# Patient Record
Sex: MALE | Race: WHITE | NOT HISPANIC OR LATINO | Employment: UNEMPLOYED | URBAN - NONMETROPOLITAN AREA
[De-identification: names, ages, dates, MRNs, and addresses within clinical notes are randomized per-mention and may not be internally consistent; named-entity substitution may affect disease eponyms.]

---

## 2017-03-14 ENCOUNTER — TRANSCRIBE ORDERS (OUTPATIENT)
Dept: ADMINISTRATIVE | Facility: HOSPITAL | Age: 47
End: 2017-03-14

## 2017-03-14 DIAGNOSIS — M54.5 LOW BACK PAIN, UNSPECIFIED BACK PAIN LATERALITY, UNSPECIFIED CHRONICITY, WITH SCIATICA PRESENCE UNSPECIFIED: Primary | ICD-10-CM

## 2017-03-17 ENCOUNTER — HOSPITAL ENCOUNTER (OUTPATIENT)
Dept: MRI IMAGING | Facility: HOSPITAL | Age: 47
Discharge: HOME | End: 2017-03-17
Admitting: PHYSICIAN ASSISTANT

## 2017-03-17 DIAGNOSIS — M54.5 LOW BACK PAIN, UNSPECIFIED BACK PAIN LATERALITY, UNSPECIFIED CHRONICITY, WITH SCIATICA PRESENCE UNSPECIFIED: ICD-10-CM

## 2017-03-17 PROCEDURE — 72148 MRI LUMBAR SPINE W/O DYE: CPT

## 2017-03-17 PROCEDURE — 72148 MRI LUMBAR SPINE W/O DYE: CPT | Performed by: RADIOLOGY

## 2020-02-27 ENCOUNTER — OFFICE VISIT (OUTPATIENT)
Dept: SURGERY | Facility: CLINIC | Age: 50
End: 2020-02-27

## 2020-02-27 VITALS — WEIGHT: 201 LBS | HEIGHT: 67 IN | BODY MASS INDEX: 31.55 KG/M2

## 2020-02-27 DIAGNOSIS — Z12.11 ENCOUNTER FOR SCREENING COLONOSCOPY: Primary | ICD-10-CM

## 2020-02-27 PROCEDURE — S0260 H&P FOR SURGERY: HCPCS | Performed by: SURGERY

## 2020-02-27 RX ORDER — LEVOTHYROXINE SODIUM 0.2 MG/1
200 TABLET ORAL DAILY
COMMUNITY
Start: 2020-02-23

## 2020-02-27 RX ORDER — LISINOPRIL 20 MG/1
20 TABLET ORAL DAILY
COMMUNITY
Start: 2020-02-23

## 2020-02-27 RX ORDER — SODIUM, POTASSIUM,MAG SULFATES 17.5-3.13G
SOLUTION, RECONSTITUTED, ORAL ORAL
Qty: 2 BOTTLE | Refills: 0 | Status: SHIPPED | OUTPATIENT
Start: 2020-02-27

## 2020-02-27 NOTE — PROGRESS NOTES
Subjective   Toy Clemons is a 50 y.o. male.     Chief Complaint: screening colonoscopy    History of Present Illness He has never had a colonoscopy and needs a screening one. No abdominal symptoms or bowel issues.     The following portions of the patient's history were reviewed and updated as appropriate: current medications, past family history, past medical history, past social history, past surgical history and problem list.    Review of Systems   Constitutional: Negative for activity change, appetite change, chills, fever and unexpected weight change.   HENT: Negative for congestion, facial swelling and sore throat.    Eyes: Negative for photophobia and visual disturbance.   Respiratory: Negative for chest tightness, shortness of breath and wheezing.    Cardiovascular: Negative for chest pain, palpitations and leg swelling.   Gastrointestinal: Negative for abdominal distention, abdominal pain, anal bleeding, blood in stool, constipation, diarrhea, nausea, rectal pain and vomiting.   Endocrine: Negative for cold intolerance, heat intolerance, polydipsia and polyuria.   Genitourinary: Negative for difficulty urinating, dysuria, flank pain and urgency.   Musculoskeletal: Negative for back pain and myalgias.   Skin: Negative for rash and wound.   Allergic/Immunologic: Negative for immunocompromised state.   Neurological: Negative for dizziness, seizures, syncope, light-headedness, numbness and headaches.   Hematological: Negative for adenopathy. Does not bruise/bleed easily.   Psychiatric/Behavioral: Negative for behavioral problems and confusion. The patient is not nervous/anxious.        Objective   Physical Exam   Constitutional: He is oriented to person, place, and time. He appears well-developed and well-nourished. He does not appear ill. No distress.   HENT:   Head: Normocephalic. Head is without laceration. Hair is normal.   Right Ear: Hearing and ear canal normal.   Left Ear: Hearing and ear canal normal.    Nose: Nose normal. No sinus tenderness. No epistaxis. Right sinus exhibits no maxillary sinus tenderness and no frontal sinus tenderness. Left sinus exhibits no maxillary sinus tenderness and no frontal sinus tenderness.   Eyes: Pupils are equal, round, and reactive to light. Conjunctivae and lids are normal.   Neck: Normal range of motion. No JVD present. No tracheal tenderness present. No tracheal deviation present. No thyroid mass and no thyromegaly present.   Cardiovascular: Normal rate and regular rhythm. Exam reveals no gallop.   No murmur heard.  Pulmonary/Chest: Effort normal and breath sounds normal. No stridor. He has no wheezes. He exhibits no tenderness.   Abdominal: Soft. Bowel sounds are normal. He exhibits no distension, no ascites and no mass. There is no tenderness. There is no rebound and no guarding. No hernia.   Musculoskeletal: He exhibits no edema or deformity.   Lymphadenopathy:     He has no cervical adenopathy.     He has no axillary adenopathy.        Right: No inguinal and no supraclavicular adenopathy present.        Left: No inguinal and no supraclavicular adenopathy present.   Neurological: He is alert and oriented to person, place, and time. He exhibits normal muscle tone.   Skin: Skin is warm, dry and intact. No rash noted. No erythema. No pallor.   Psychiatric: He has a normal mood and affect. His behavior is normal. Thought content normal.   Vitals reviewed.       Assessment/Plan   Toy was seen today for colonoscopy.    Diagnoses and all orders for this visit:    Encounter for screening colonoscopy    colonoscopy

## 2020-03-06 ENCOUNTER — HOSPITAL ENCOUNTER (OUTPATIENT)
Facility: HOSPITAL | Age: 50
Setting detail: HOSPITAL OUTPATIENT SURGERY
Discharge: HOME | End: 2020-03-06
Attending: SURGERY | Admitting: SURGERY

## 2020-03-06 ENCOUNTER — ANESTHESIA EVENT (OUTPATIENT)
Dept: PERIOP | Facility: HOSPITAL | Age: 50
End: 2020-03-06

## 2020-03-06 ENCOUNTER — ANESTHESIA (OUTPATIENT)
Dept: PERIOP | Facility: HOSPITAL | Age: 50
End: 2020-03-06

## 2020-03-06 VITALS
SYSTOLIC BLOOD PRESSURE: 121 MMHG | WEIGHT: 194 LBS | DIASTOLIC BLOOD PRESSURE: 84 MMHG | BODY MASS INDEX: 30.45 KG/M2 | OXYGEN SATURATION: 97 % | RESPIRATION RATE: 18 BRPM | TEMPERATURE: 97.5 F | HEIGHT: 67 IN | HEART RATE: 64 BPM

## 2020-03-06 PROCEDURE — 25010000002 PROPOFOL 10 MG/ML EMULSION: Performed by: NURSE ANESTHETIST, CERTIFIED REGISTERED

## 2020-03-06 PROCEDURE — 45378 DIAGNOSTIC COLONOSCOPY: CPT | Performed by: SURGERY

## 2020-03-06 RX ORDER — ONDANSETRON 2 MG/ML
4 INJECTION INTRAMUSCULAR; INTRAVENOUS AS NEEDED
Status: DISCONTINUED | OUTPATIENT
Start: 2020-03-06 | End: 2020-03-06 | Stop reason: HOSPADM

## 2020-03-06 RX ORDER — IPRATROPIUM BROMIDE AND ALBUTEROL SULFATE 2.5; .5 MG/3ML; MG/3ML
3 SOLUTION RESPIRATORY (INHALATION) ONCE AS NEEDED
Status: DISCONTINUED | OUTPATIENT
Start: 2020-03-06 | End: 2020-03-06 | Stop reason: HOSPADM

## 2020-03-06 RX ORDER — SODIUM CHLORIDE 0.9 % (FLUSH) 0.9 %
10 SYRINGE (ML) INJECTION AS NEEDED
Status: DISCONTINUED | OUTPATIENT
Start: 2020-03-06 | End: 2020-03-06 | Stop reason: HOSPADM

## 2020-03-06 RX ORDER — PROPOFOL 10 MG/ML
VIAL (ML) INTRAVENOUS AS NEEDED
Status: DISCONTINUED | OUTPATIENT
Start: 2020-03-06 | End: 2020-03-06 | Stop reason: SURG

## 2020-03-06 RX ORDER — SODIUM CHLORIDE 0.9 % (FLUSH) 0.9 %
10 SYRINGE (ML) INJECTION EVERY 12 HOURS SCHEDULED
Status: DISCONTINUED | OUTPATIENT
Start: 2020-03-06 | End: 2020-03-06 | Stop reason: HOSPADM

## 2020-03-06 RX ORDER — SODIUM CHLORIDE, SODIUM LACTATE, POTASSIUM CHLORIDE, CALCIUM CHLORIDE 600; 310; 30; 20 MG/100ML; MG/100ML; MG/100ML; MG/100ML
125 INJECTION, SOLUTION INTRAVENOUS CONTINUOUS
Status: DISCONTINUED | OUTPATIENT
Start: 2020-03-06 | End: 2020-03-06 | Stop reason: HOSPADM

## 2020-03-06 RX ADMIN — PROPOFOL 120 MG: 10 INJECTION, EMULSION INTRAVENOUS at 11:15

## 2020-03-06 RX ADMIN — SODIUM CHLORIDE, POTASSIUM CHLORIDE, SODIUM LACTATE AND CALCIUM CHLORIDE: 600; 310; 30; 20 INJECTION, SOLUTION INTRAVENOUS at 11:11

## 2020-03-06 RX ADMIN — PROPOFOL 140 MCG/KG/MIN: 10 INJECTION, EMULSION INTRAVENOUS at 11:15

## 2020-03-06 NOTE — ANESTHESIA PREPROCEDURE EVALUATION
Anesthesia Evaluation     no history of anesthetic complications:  NPO Solid Status: > 8 hours  NPO Liquid Status: > 8 hours           Airway   Mallampati: II  TM distance: >3 FB  Neck ROM: full  No difficulty expected  Dental - normal exam   (+) upper dentures    Pulmonary - normal exam   (+) a smoker, sleep apnea,   Cardiovascular - normal exam    (+) hypertension,       Neuro/Psych  GI/Hepatic/Renal/Endo      Musculoskeletal     Abdominal  - normal exam   Substance History      OB/GYN          Other                      Anesthesia Plan    ASA 2     general     intravenous induction     Anesthetic plan, all risks, benefits, and alternatives have been provided, discussed and informed consent has been obtained with: patient.

## 2020-03-06 NOTE — OP NOTE
COLONOSCOPY  Procedure Note    Toy Clemons  3/6/2020    Pre-op Diagnosis:   Encounter for screening colonoscopy [Z12.11]    Post-op Diagnosis:  Normal colon       Procedure(s):  COLONOSCOPY    Surgeon(s):  Juan Ramon Roper MD    Anesthesia: General    Staff:   Circulator: Florence Mcclelland RN  Endo Technician: Carmen Pimentel    Estimated Blood Loss: none    Specimens:                * No orders in the log *      Drains: * No LDAs found *    Procedure: The scope advanced to the cecum. The ileocecal valve and terminal ileum were unremarkable. No polyps or inflammation were seen throughout the colon.     Findings: normal colon    Complications: none   Grafts / Implants N/A    Juan Ramon Roper MD     Date: 3/6/2020  Time: 12:06 PM

## 2020-03-06 NOTE — ANESTHESIA POSTPROCEDURE EVALUATION
Patient: Toy Clemons    Procedure Summary     Date:  03/06/20 Room / Location:  [facility] OR  /  [facility] OR    Anesthesia Start:  1111 Anesthesia Stop:  1129    Procedure:  COLONOSCOPY (N/A ) Diagnosis:       Encounter for screening colonoscopy      (Encounter for screening colonoscopy [Z12.11])    Surgeon:  Juan Ramon Roper MD Provider:  Kendall Leigh MD    Anesthesia Type:  general ASA Status:  2          Anesthesia Type: general    Vitals  Vitals Value Taken Time   /84 3/6/2020 12:05 PM   Temp 97.5 °F (36.4 °C) 3/6/2020 11:35 AM   Pulse 64 3/6/2020 12:05 PM   Resp 18 3/6/2020 12:05 PM   SpO2 97 % 3/6/2020 12:05 PM           Post Anesthesia Care and Evaluation    Patient location during evaluation: bedside  Patient participation: complete - patient participated  Level of consciousness: awake and alert  Pain score: 1  Pain management: adequate  Airway patency: patent  Anesthetic complications: No anesthetic complications  PONV Status: none  Cardiovascular status: acceptable  Respiratory status: acceptable  Hydration status: acceptable

## 2020-10-28 ENCOUNTER — HOSPITAL ENCOUNTER (EMERGENCY)
Facility: HOSPITAL | Age: 50
Discharge: HOME | End: 2020-10-28
Attending: EMERGENCY MEDICINE | Admitting: EMERGENCY MEDICINE

## 2020-10-28 VITALS
RESPIRATION RATE: 18 BRPM | TEMPERATURE: 97.7 F | DIASTOLIC BLOOD PRESSURE: 77 MMHG | HEIGHT: 67 IN | HEART RATE: 68 BPM | OXYGEN SATURATION: 98 % | BODY MASS INDEX: 31.39 KG/M2 | SYSTOLIC BLOOD PRESSURE: 112 MMHG | WEIGHT: 200 LBS

## 2020-10-28 DIAGNOSIS — F10.10 ALCOHOL ABUSE: Primary | ICD-10-CM

## 2020-10-28 LAB
6-ACETYL MORPHINE: NEGATIVE
ALBUMIN SERPL-MCNC: 4.72 G/DL (ref 3.5–5.2)
ALBUMIN/GLOB SERPL: 1.9 G/DL
ALP SERPL-CCNC: 70 U/L (ref 39–117)
ALT SERPL W P-5'-P-CCNC: 20 U/L (ref 1–41)
AMPHET+METHAMPHET UR QL: NEGATIVE
ANION GAP SERPL CALCULATED.3IONS-SCNC: 11.5 MMOL/L (ref 5–15)
AST SERPL-CCNC: 35 U/L (ref 1–40)
BARBITURATES UR QL SCN: NEGATIVE
BASOPHILS # BLD AUTO: 0.05 10*3/MM3 (ref 0–0.2)
BASOPHILS NFR BLD AUTO: 0.5 % (ref 0–1.5)
BENZODIAZ UR QL SCN: NEGATIVE
BILIRUB SERPL-MCNC: 0.4 MG/DL (ref 0–1.2)
BUN SERPL-MCNC: 5 MG/DL (ref 6–20)
BUN/CREAT SERPL: 6.7 (ref 7–25)
BUPRENORPHINE SERPL-MCNC: NEGATIVE NG/ML
CALCIUM SPEC-SCNC: 9.1 MG/DL (ref 8.6–10.5)
CANNABINOIDS SERPL QL: NEGATIVE
CHLORIDE SERPL-SCNC: 96 MMOL/L (ref 98–107)
CO2 SERPL-SCNC: 24.5 MMOL/L (ref 22–29)
COCAINE UR QL: NEGATIVE
CREAT SERPL-MCNC: 0.75 MG/DL (ref 0.76–1.27)
DEPRECATED RDW RBC AUTO: 47.1 FL (ref 37–54)
EOSINOPHIL # BLD AUTO: 0.3 10*3/MM3 (ref 0–0.4)
EOSINOPHIL NFR BLD AUTO: 3.2 % (ref 0.3–6.2)
ERYTHROCYTE [DISTWIDTH] IN BLOOD BY AUTOMATED COUNT: 13.8 % (ref 12.3–15.4)
ETHANOL BLD-MCNC: 229 MG/DL (ref 0–10)
ETHANOL UR QL: 0.23 %
GFR SERPL CREATININE-BSD FRML MDRD: 111 ML/MIN/1.73
GLOBULIN UR ELPH-MCNC: 2.5 GM/DL
GLUCOSE SERPL-MCNC: 89 MG/DL (ref 65–99)
HCT VFR BLD AUTO: 44.6 % (ref 37.5–51)
HGB BLD-MCNC: 15.2 G/DL (ref 13–17.7)
IMM GRANULOCYTES # BLD AUTO: 0.04 10*3/MM3 (ref 0–0.05)
IMM GRANULOCYTES NFR BLD AUTO: 0.4 % (ref 0–0.5)
LYMPHOCYTES # BLD AUTO: 3.51 10*3/MM3 (ref 0.7–3.1)
LYMPHOCYTES NFR BLD AUTO: 37.5 % (ref 19.6–45.3)
MAGNESIUM SERPL-MCNC: 2.3 MG/DL (ref 1.6–2.6)
MCH RBC QN AUTO: 31.6 PG (ref 26.6–33)
MCHC RBC AUTO-ENTMCNC: 34.1 G/DL (ref 31.5–35.7)
MCV RBC AUTO: 92.7 FL (ref 79–97)
METHADONE UR QL SCN: NEGATIVE
MONOCYTES # BLD AUTO: 0.68 10*3/MM3 (ref 0.1–0.9)
MONOCYTES NFR BLD AUTO: 7.3 % (ref 5–12)
NEUTROPHILS NFR BLD AUTO: 4.78 10*3/MM3 (ref 1.7–7)
NEUTROPHILS NFR BLD AUTO: 51.1 % (ref 42.7–76)
NRBC BLD AUTO-RTO: 0 /100 WBC (ref 0–0.2)
OPIATES UR QL: NEGATIVE
OXYCODONE UR QL SCN: NEGATIVE
PCP UR QL SCN: NEGATIVE
PLATELET # BLD AUTO: 278 10*3/MM3 (ref 140–450)
PMV BLD AUTO: 8.9 FL (ref 6–12)
POTASSIUM SERPL-SCNC: 4.6 MMOL/L (ref 3.5–5.2)
PROT SERPL-MCNC: 7.2 G/DL (ref 6–8.5)
RBC # BLD AUTO: 4.81 10*6/MM3 (ref 4.14–5.8)
SODIUM SERPL-SCNC: 132 MMOL/L (ref 136–145)
WBC # BLD AUTO: 9.36 10*3/MM3 (ref 3.4–10.8)

## 2020-10-28 PROCEDURE — 80307 DRUG TEST PRSMV CHEM ANLYZR: CPT | Performed by: PHYSICIAN ASSISTANT

## 2020-10-28 PROCEDURE — 85025 COMPLETE CBC W/AUTO DIFF WBC: CPT | Performed by: PHYSICIAN ASSISTANT

## 2020-10-28 PROCEDURE — 99284 EMERGENCY DEPT VISIT MOD MDM: CPT

## 2020-10-28 PROCEDURE — 83735 ASSAY OF MAGNESIUM: CPT | Performed by: PHYSICIAN ASSISTANT

## 2020-10-28 PROCEDURE — 80053 COMPREHEN METABOLIC PANEL: CPT | Performed by: PHYSICIAN ASSISTANT

## 2022-07-13 ENCOUNTER — HOSPITAL ENCOUNTER (EMERGENCY)
Facility: HOSPITAL | Age: 52
Discharge: HOME | End: 2022-07-13
Attending: STUDENT IN AN ORGANIZED HEALTH CARE EDUCATION/TRAINING PROGRAM | Admitting: STUDENT IN AN ORGANIZED HEALTH CARE EDUCATION/TRAINING PROGRAM

## 2022-07-13 ENCOUNTER — APPOINTMENT (OUTPATIENT)
Dept: GENERAL RADIOLOGY | Facility: HOSPITAL | Age: 52
End: 2022-07-13

## 2022-07-13 VITALS
WEIGHT: 200 LBS | HEART RATE: 75 BPM | DIASTOLIC BLOOD PRESSURE: 75 MMHG | SYSTOLIC BLOOD PRESSURE: 122 MMHG | BODY MASS INDEX: 31.39 KG/M2 | OXYGEN SATURATION: 98 % | TEMPERATURE: 97.1 F | HEIGHT: 67 IN | RESPIRATION RATE: 18 BRPM

## 2022-07-13 DIAGNOSIS — U07.1 COVID-19: ICD-10-CM

## 2022-07-13 DIAGNOSIS — J06.9 VIRAL UPPER RESPIRATORY INFECTION: Primary | ICD-10-CM

## 2022-07-13 LAB
FLUAV RNA RESP QL NAA+PROBE: NOT DETECTED
FLUBV RNA RESP QL NAA+PROBE: NOT DETECTED
SARS-COV-2 RNA RESP QL NAA+PROBE: DETECTED

## 2022-07-13 PROCEDURE — 71045 X-RAY EXAM CHEST 1 VIEW: CPT

## 2022-07-13 PROCEDURE — 87636 SARSCOV2 & INF A&B AMP PRB: CPT | Performed by: STUDENT IN AN ORGANIZED HEALTH CARE EDUCATION/TRAINING PROGRAM

## 2022-07-13 PROCEDURE — 96372 THER/PROPH/DIAG INJ SC/IM: CPT

## 2022-07-13 PROCEDURE — 99283 EMERGENCY DEPT VISIT LOW MDM: CPT

## 2022-07-13 PROCEDURE — 25010000002 DEXAMETHASONE PER 1 MG: Performed by: STUDENT IN AN ORGANIZED HEALTH CARE EDUCATION/TRAINING PROGRAM

## 2022-07-13 PROCEDURE — 25010000002 KETOROLAC TROMETHAMINE PER 15 MG: Performed by: STUDENT IN AN ORGANIZED HEALTH CARE EDUCATION/TRAINING PROGRAM

## 2022-07-13 PROCEDURE — 71045 X-RAY EXAM CHEST 1 VIEW: CPT | Performed by: RADIOLOGY

## 2022-07-13 RX ORDER — AZITHROMYCIN 250 MG/1
250 TABLET, FILM COATED ORAL DAILY
Qty: 4 TABLET | Refills: 0 | Status: SHIPPED | OUTPATIENT
Start: 2022-07-13

## 2022-07-13 RX ORDER — DEXAMETHASONE 6 MG/1
6 TABLET ORAL
Qty: 6 TABLET | Refills: 0 | Status: SHIPPED | OUTPATIENT
Start: 2022-07-13

## 2022-07-13 RX ORDER — DEXAMETHASONE SODIUM PHOSPHATE 4 MG/ML
4 INJECTION, SOLUTION INTRA-ARTICULAR; INTRALESIONAL; INTRAMUSCULAR; INTRAVENOUS; SOFT TISSUE ONCE
Status: COMPLETED | OUTPATIENT
Start: 2022-07-13 | End: 2022-07-13

## 2022-07-13 RX ORDER — KETOROLAC TROMETHAMINE 30 MG/ML
30 INJECTION, SOLUTION INTRAMUSCULAR; INTRAVENOUS ONCE
Status: COMPLETED | OUTPATIENT
Start: 2022-07-13 | End: 2022-07-13

## 2022-07-13 RX ORDER — AZITHROMYCIN 250 MG/1
500 TABLET, FILM COATED ORAL ONCE
Status: COMPLETED | OUTPATIENT
Start: 2022-07-13 | End: 2022-07-13

## 2022-07-13 RX ADMIN — KETOROLAC TROMETHAMINE 30 MG: 30 INJECTION, SOLUTION INTRAMUSCULAR; INTRAVENOUS at 09:45

## 2022-07-13 RX ADMIN — AZITHROMYCIN 500 MG: 250 TABLET, FILM COATED ORAL at 10:06

## 2022-07-13 RX ADMIN — DEXAMETHASONE SODIUM PHOSPHATE 4 MG: 4 INJECTION, SOLUTION INTRA-ARTICULAR; INTRALESIONAL; INTRAMUSCULAR; INTRAVENOUS; SOFT TISSUE at 09:45

## 2022-07-13 NOTE — ED PROVIDER NOTES
Subjective   52-year-old male presents the ER with a primary complaint of cough, fever, and congestion for the past 1 to 2 days.  Patient noted mild cough that started yesterday.  Patient noted fever this morning.  Improvement with over-the-counter medications.  No obvious aggravating factors.  Denied chest pain.  Denied hemoptysis.  Denied sputum production.  No obvious further aggravating or alleviating factors.  No infectious exposure.  Vital signs stable          Review of Systems   Constitutional: Positive for chills, fatigue and fever.   Respiratory: Positive for cough.    All other systems reviewed and are negative.      Past Medical History:   Diagnosis Date   • Disease of thyroid gland    • Hypertension    • Sleep apnea        No Known Allergies    Past Surgical History:   Procedure Laterality Date   • COLONOSCOPY N/A 3/6/2020    Procedure: COLONOSCOPY;  Surgeon: Juan Ramon Roper MD;  Location: [facility];  Service: Gastroenterology;  Laterality: N/A;   • HERNIA REPAIR     • SINUS SURGERY         No family history on file.    Social History     Socioeconomic History   • Marital status:    Tobacco Use   • Smoking status: Current Every Day Smoker     Packs/day: 1.00     Years: 30.00     Pack years: 30.00     Types: Cigarettes   • Smokeless tobacco: Current User   Substance and Sexual Activity   • Alcohol use: Not Currently     Comment: QUIT FEB 2020   • Drug use: Never           Objective   Physical Exam  Constitutional:       General: He is not in acute distress.     Appearance: He is well-developed. He is not ill-appearing.   HENT:      Head: Normocephalic and atraumatic.   Eyes:      Extraocular Movements: Extraocular movements intact.      Pupils: Pupils are equal, round, and reactive to light.   Neck:      Vascular: No JVD.   Cardiovascular:      Rate and Rhythm: Normal rate and regular rhythm.      Heart sounds: Normal heart sounds. No murmur heard.  Pulmonary:      Effort: No tachypnea, accessory  muscle usage or respiratory distress.      Breath sounds: Normal breath sounds. No stridor. No decreased breath sounds, wheezing, rhonchi or rales.   Chest:      Chest wall: No deformity, tenderness or crepitus.   Abdominal:      General: Bowel sounds are normal.      Palpations: Abdomen is soft.      Tenderness: There is no abdominal tenderness. There is no guarding or rebound.   Musculoskeletal:         General: Normal range of motion.      Cervical back: Normal range of motion and neck supple.      Right lower leg: No tenderness. No edema.      Left lower leg: No tenderness. No edema.   Lymphadenopathy:      Cervical: No cervical adenopathy.   Skin:     General: Skin is warm and dry.      Coloration: Skin is not cyanotic.      Findings: No ecchymosis or erythema.   Neurological:      General: No focal deficit present.      Mental Status: He is alert and oriented to person, place, and time.      Cranial Nerves: No cranial nerve deficit.      Motor: No weakness.   Psychiatric:         Mood and Affect: Mood normal. Mood is not anxious.         Behavior: Behavior normal. Behavior is not agitated.         Procedures           ED Course  ED Course as of 07/13/22 0959   Wed Jul 13, 2022   0958 COVID testing positive.  Chest x-ray relatively unremarkable.  No obvious signs of pneumonia.  Patient will receive Decadron and azithromycin.  Work up and results were discussed throughly with the patient.  The patient will be discharged for further monitoring and management with their PCP.  Red flags, warning signs, worsening symptoms, and when to return to the ER discussed with and understood by the patient.  Patient will follow up with their PCP in a timely manner.  Vitals stable at discharge. [SF]      ED Course User Index  [SF] Karri Oconnell DO                                           [facility]    Final diagnoses:   Viral upper respiratory infection   COVID-19       ED Disposition  ED Disposition     ED Disposition   Discharge     Condition   Stable    Comment   --             Kiki Vides PA  [address]  [address]  [facility] [id number] [phone]    In 1 week      [facility]  [address] [facility] [address] [phone]    If symptoms worsen         Medication List      New Prescriptions    azithromycin 250 MG tablet  Commonly known as: ZITHROMAX  Take 1 tablet by mouth Daily.     dexamethasone 6 MG tablet  Commonly known as: DECADRON  Take 1 tablet by mouth Daily With Breakfast.           Where to Get Your Medications      These medications were sent to [facility] [address] [place] [address] - [address] [address] - [phone]  - [phone] FX  [address] [id number]    Phone: [phone]   · azithromycin 250 MG tablet  · dexamethasone 6 MG tablet          Karri Oconnell DO  07/13/22 0959

## 2023-09-21 ENCOUNTER — TRANSCRIBE ORDERS (OUTPATIENT)
Dept: ADMINISTRATIVE | Facility: HOSPITAL | Age: 53
End: 2023-09-21

## 2023-09-21 DIAGNOSIS — R10.11 RIGHT UPPER QUADRANT PAIN: Primary | ICD-10-CM

## 2023-09-22 ENCOUNTER — HOSPITAL ENCOUNTER (OUTPATIENT)
Dept: ULTRASOUND IMAGING | Facility: HOSPITAL | Age: 53
Discharge: HOME | End: 2023-09-22
Admitting: PHYSICIAN ASSISTANT
Payer: MEDICAID

## 2023-09-22 DIAGNOSIS — R10.11 RIGHT UPPER QUADRANT PAIN: ICD-10-CM

## 2023-09-22 PROCEDURE — 76700 US EXAM ABDOM COMPLETE: CPT

## 2023-10-16 ENCOUNTER — TRANSCRIBE ORDERS (OUTPATIENT)
Dept: ADMINISTRATIVE | Facility: HOSPITAL | Age: 53
End: 2023-10-16
Payer: MEDICAID

## 2023-10-16 DIAGNOSIS — K52.89: Primary | ICD-10-CM

## 2023-10-17 ENCOUNTER — HOSPITAL ENCOUNTER (OUTPATIENT)
Dept: NUCLEAR MEDICINE | Facility: HOSPITAL | Age: 53
Discharge: HOME | End: 2023-10-17
Payer: MEDICAID

## 2023-10-17 DIAGNOSIS — K52.89: ICD-10-CM

## 2023-10-17 PROCEDURE — A9537 TC99M MEBROFENIN: HCPCS | Performed by: PHYSICIAN ASSISTANT

## 2023-10-17 PROCEDURE — 25010000002 SINCALIDE PER 5 MCG: Performed by: PHYSICIAN ASSISTANT

## 2023-10-17 PROCEDURE — 0 TECHNETIUM TC 99M MEBROFENIN KIT: Performed by: PHYSICIAN ASSISTANT

## 2023-10-17 PROCEDURE — 78227 HEPATOBIL SYST IMAGE W/DRUG: CPT

## 2023-10-17 RX ORDER — KIT FOR THE PREPARATION OF TECHNETIUM TC 99M MEBROFENIN 45 MG/10ML
1 INJECTION, POWDER, LYOPHILIZED, FOR SOLUTION INTRAVENOUS
Status: COMPLETED | OUTPATIENT
Start: 2023-10-17 | End: 2023-10-17

## 2023-10-17 RX ORDER — SINCALIDE 5 UG/5ML
0.04 INJECTION, POWDER, LYOPHILIZED, FOR SOLUTION INTRAVENOUS
Status: COMPLETED | OUTPATIENT
Start: 2023-10-17 | End: 2023-10-17

## 2023-10-17 RX ADMIN — MEBROFENIN 1 DOSE: 45 INJECTION, POWDER, LYOPHILIZED, FOR SOLUTION INTRAVENOUS at 14:46

## 2023-10-17 RX ADMIN — SINCALIDE 3.6 MCG: 5 INJECTION, POWDER, LYOPHILIZED, FOR SOLUTION INTRAVENOUS at 15:30

## 2023-11-10 ENCOUNTER — HOSPITAL ENCOUNTER (EMERGENCY)
Facility: HOSPITAL | Age: 53
Discharge: HOME | End: 2023-11-10
Attending: EMERGENCY MEDICINE
Payer: MEDICAID

## 2023-11-10 ENCOUNTER — APPOINTMENT (OUTPATIENT)
Dept: GENERAL RADIOLOGY | Facility: HOSPITAL | Age: 53
End: 2023-11-10
Payer: MEDICAID

## 2023-11-10 VITALS
HEART RATE: 103 BPM | DIASTOLIC BLOOD PRESSURE: 78 MMHG | BODY MASS INDEX: 31.08 KG/M2 | RESPIRATION RATE: 16 BRPM | WEIGHT: 198 LBS | SYSTOLIC BLOOD PRESSURE: 146 MMHG | HEIGHT: 67 IN | TEMPERATURE: 97.6 F | OXYGEN SATURATION: 96 %

## 2023-11-10 DIAGNOSIS — S39.012A STRAIN OF LUMBAR REGION, INITIAL ENCOUNTER: Primary | ICD-10-CM

## 2023-11-10 PROCEDURE — 72110 X-RAY EXAM L-2 SPINE 4/>VWS: CPT | Performed by: RADIOLOGY

## 2023-11-10 PROCEDURE — 96372 THER/PROPH/DIAG INJ SC/IM: CPT

## 2023-11-10 PROCEDURE — 99282 EMERGENCY DEPT VISIT SF MDM: CPT

## 2023-11-10 PROCEDURE — 72110 X-RAY EXAM L-2 SPINE 4/>VWS: CPT

## 2023-11-10 PROCEDURE — 25010000002 DEXAMETHASONE SODIUM PHOSPHATE 10 MG/ML SOLUTION: Performed by: PHYSICIAN ASSISTANT

## 2023-11-10 PROCEDURE — 25010000002 ORPHENADRINE CITRATE PER 60 MG: Performed by: PHYSICIAN ASSISTANT

## 2023-11-10 PROCEDURE — 25010000002 KETOROLAC TROMETHAMINE PER 15 MG: Performed by: PHYSICIAN ASSISTANT

## 2023-11-10 RX ORDER — KETOROLAC TROMETHAMINE 10 MG/1
10 TABLET, FILM COATED ORAL EVERY 6 HOURS PRN
Qty: 20 TABLET | Refills: 0 | Status: SHIPPED | OUTPATIENT
Start: 2023-11-10 | End: 2023-11-15

## 2023-11-10 RX ORDER — KETOROLAC TROMETHAMINE 30 MG/ML
60 INJECTION, SOLUTION INTRAMUSCULAR; INTRAVENOUS ONCE
Status: COMPLETED | OUTPATIENT
Start: 2023-11-10 | End: 2023-11-10

## 2023-11-10 RX ORDER — ORPHENADRINE CITRATE 30 MG/ML
60 INJECTION INTRAMUSCULAR; INTRAVENOUS ONCE
Status: COMPLETED | OUTPATIENT
Start: 2023-11-10 | End: 2023-11-10

## 2023-11-10 RX ORDER — CYCLOBENZAPRINE HCL 10 MG
10 TABLET ORAL 3 TIMES DAILY PRN
Qty: 21 TABLET | Refills: 0 | Status: SHIPPED | OUTPATIENT
Start: 2023-11-10 | End: 2023-11-17

## 2023-11-10 RX ORDER — METHYLPREDNISOLONE 4 MG/1
TABLET ORAL
Qty: 21 TABLET | Refills: 0 | Status: SHIPPED | OUTPATIENT
Start: 2023-11-10

## 2023-11-10 RX ORDER — DEXAMETHASONE SODIUM PHOSPHATE 10 MG/ML
10 INJECTION, SOLUTION INTRAMUSCULAR; INTRAVENOUS ONCE
Status: COMPLETED | OUTPATIENT
Start: 2023-11-10 | End: 2023-11-10

## 2023-11-10 RX ADMIN — ORPHENADRINE CITRATE 60 MG: 60 INJECTION INTRAMUSCULAR; INTRAVENOUS at 11:09

## 2023-11-10 RX ADMIN — DEXAMETHASONE SODIUM PHOSPHATE 10 MG: 10 INJECTION INTRAMUSCULAR; INTRAVENOUS at 11:09

## 2023-11-10 RX ADMIN — KETOROLAC TROMETHAMINE 60 MG: 60 INJECTION, SOLUTION INTRAMUSCULAR at 11:09

## 2023-11-10 NOTE — ED NOTES
Morphine refused by patient at this time, returned to med return box in [facility] at this time with second RN. Provider made aware

## 2023-11-10 NOTE — ED TRIAGE NOTES
MEDICAL SCREENING:    Reason for Visit: Back Pain    Patient initially seen in triage.  The patient was advised further evaluation and diagnostic testing will be needed, some of the treatment and testing will be initiated in the lobby in order to begin the process.  The patient will be returned to the waiting area for the time being and possibly be re-assessed by a subsequent ED provider.  The patient will be brought back to the treatment area in as timely manner as possible.

## 2023-11-10 NOTE — ED PROVIDER NOTES
Subjective   History of Present Illness  This is a 53 year old male patient who presents to the ER with chief complaint of low back pain. PMH significant for chronic low back pain, hypothyroidism, HTN and sleep apnea. 1 week ago, without precipitating injury, the patient while at work as a , started experiencing bilateral lower back pain. He says he has had pain like this before. The pain radiates into the posterior aspect of the left leg and into his groin area. He denies numbness/tingling, bowel/bladder dysfunction. He has tried OTC regimens for pain relief without success.       Review of Systems   Constitutional: Negative.  Negative for fever.   HENT: Negative.     Respiratory: Negative.     Cardiovascular: Negative.  Negative for chest pain.   Gastrointestinal: Negative.  Negative for abdominal pain.   Endocrine: Negative.    Genitourinary: Negative.  Negative for dysuria.   Musculoskeletal:  Positive for back pain. Negative for arthralgias, gait problem, joint swelling, myalgias, neck pain and neck stiffness.   Skin: Negative.    Neurological: Negative.    Psychiatric/Behavioral: Negative.     All other systems reviewed and are negative.      Past Medical History:   Diagnosis Date    Disease of thyroid gland     Hypertension     Sleep apnea        No Known Allergies    Past Surgical History:   Procedure Laterality Date    COLONOSCOPY N/A 3/6/2020    Procedure: COLONOSCOPY;  Surgeon: Juan Ramon Roper MD;  Location: [facility];  Service: Gastroenterology;  Laterality: N/A;    HERNIA REPAIR      SINUS SURGERY         No family history on file.    Social History     Socioeconomic History    Marital status:    Tobacco Use    Smoking status: Every Day     Packs/day: 1.00     Years: 30.00     Additional pack years: 0.00     Total pack years: 30.00     Types: Cigarettes    Smokeless tobacco: Current   Substance and Sexual Activity    Alcohol use: Not Currently     Comment: QUIT FEB 2020     Drug use: Never           Objective   Physical Exam  Vitals and nursing note reviewed.   Constitutional:       General: He is not in acute distress.     Appearance: He is well-developed. He is not diaphoretic.   HENT:      Head: Normocephalic and atraumatic.      Right Ear: External ear normal.      Left Ear: External ear normal.      Nose: Nose normal.   Eyes:      Conjunctiva/sclera: Conjunctivae normal.      Pupils: Pupils are equal, round, and reactive to light.   Neck:      Vascular: No JVD.      Trachea: No tracheal deviation.   Cardiovascular:      Rate and Rhythm: Normal rate and regular rhythm.      Heart sounds: Normal heart sounds. No murmur heard.  Pulmonary:      Effort: Pulmonary effort is normal. No respiratory distress.      Breath sounds: Normal breath sounds. No wheezing.   Abdominal:      General: Bowel sounds are normal.      Palpations: Abdomen is soft.      Tenderness: There is no abdominal tenderness.   Musculoskeletal:         General: Tenderness present. No swelling, deformity or signs of injury. Normal range of motion.      Cervical back: Normal range of motion and neck supple.      Comments: Skin about the low back is intact with no bruising, abrasion or edema. Tenderness to palpation noted in the paraspinal muscles of the Lspine bilaterally. Full ROM BLE. Neurovascular status and sensation BLE intact.    Skin:     General: Skin is warm and dry.      Coloration: Skin is not pale.      Findings: No erythema or rash.   Neurological:      General: No focal deficit present.      Mental Status: He is alert and oriented to person, place, and time. Mental status is at baseline.      Cranial Nerves: No cranial nerve deficit.      Sensory: No sensory deficit.      Motor: No weakness.      Coordination: Coordination normal.      Gait: Gait normal.      Deep Tendon Reflexes: Reflexes normal.   Psychiatric:         Behavior: Behavior normal.         Thought Content: Thought content normal.          Procedures           ED Course  ED Course as of 11/10/23 1249   Fri Nov 10, 2023   1016 XR Spine Lumbar Complete 4+VW  IMPRESSION:    No acute findings in the lumbar spine.        This report was finalized on 11/10/2023 10:09 AM by Dr. Moe Murray MD.   [MM]   1229 Patient diagnosed with low back strain. Discussed the possibility of ordering an MRI today but he would rather follow up with his PCP and do that outpatient. Also offered to explore kidney stone as a possible diagnosis by ordering a UA but he says he would just rather leave at this time. Will be d/c rx for medrol dose packet, toradol and flexeril. Will return to ER if symptoms worsen.  [MM]      ED Course User Index  [MM] Tamica Pacheco PA                                           Medical Decision Making    This is a 53 year old male patient who presents to the ER with chief complaint of low back pain. PMH significant for chronic low back pain, hypothyroidism, HTN and sleep apnea. 1 week ago, without precipitating injury, the patient while at work as a , started experiencing bilateral lower back pain. He says he has had pain like this before. The pain radiates into the posterior aspect of the left leg and into his groin area. He denies numbness/tingling, bowel/bladder dysfunction. He has tried OTC regimens for pain relief without success.       Problems Addressed:  Strain of lumbar region, initial encounter: complicated acute illness or injury    Amount and/or Complexity of Data Reviewed  Radiology: ordered. Decision-making details documented in ED Course.    Risk  Prescription drug management.        Final diagnoses:   Strain of lumbar region, initial encounter       ED Disposition  ED Disposition       ED Disposition   Discharge    Condition   Stable    Comment   --               Kiki Vides PA  [address]  [address]  [facility] [address]  [phone]    In 2 days           Medication List        New  Prescriptions      cyclobenzaprine 10 MG tablet  Commonly known as: FLEXERIL  Take 1 tablet by mouth 3 (Three) Times a Day As Needed for Muscle Spasms for up to 7 days.     ketorolac 10 MG tablet  Commonly known as: TORADOL  Take 1 tablet by mouth Every 6 (Six) Hours As Needed for Moderate Pain for up to 5 days.     methylPREDNISolone 4 MG dose pack  Commonly known as: MEDROL  Take as directed on package instructions.            Stop      azithromycin 250 MG tablet  Commonly known as: ZITHROMAX     dexAMETHasone 6 MG tablet  Commonly known as: DECADRON               Where to Get Your Medications        These medications were sent to [facility] [address] [place], [place] [address] [address] [address] - [phone]  - [phone]   [address] [address]      Phone: [phone]   cyclobenzaprine 10 MG tablet  ketorolac 10 MG tablet  methylPREDNISolone 4 MG dose pack            Tamica Pacheco PA  11/10/23 1237       Tamica Pacheco PA  11/10/23 1244

## 2024-02-27 ENCOUNTER — TRANSCRIBE ORDERS (OUTPATIENT)
Dept: ADMINISTRATIVE | Facility: HOSPITAL | Age: 54
End: 2024-02-27
Payer: MEDICAID

## 2024-02-27 DIAGNOSIS — R10.9 ABDOMINAL PAIN, UNSPECIFIED ABDOMINAL LOCATION: Primary | ICD-10-CM

## 2024-03-15 ENCOUNTER — HOSPITAL ENCOUNTER (OUTPATIENT)
Dept: CT IMAGING | Facility: HOSPITAL | Age: 54
Discharge: HOME | End: 2024-03-15
Payer: MEDICAID

## 2024-03-15 DIAGNOSIS — R10.9 ABDOMINAL PAIN, UNSPECIFIED ABDOMINAL LOCATION: ICD-10-CM

## 2024-03-15 PROCEDURE — 25510000001 IOPAMIDOL 61 % SOLUTION: Performed by: NURSE PRACTITIONER

## 2024-03-15 PROCEDURE — 74160 CT ABDOMEN W/CONTRAST: CPT

## 2024-03-15 RX ADMIN — IOPAMIDOL 89 ML: 612 INJECTION, SOLUTION INTRAVENOUS at 08:44

## 2024-03-19 LAB — CREAT BLDA-MCNC: 0.9 MG/DL (ref 0.6–1.3)

## 2024-10-14 ENCOUNTER — HOSPITAL ENCOUNTER (OUTPATIENT)
Dept: GENERAL RADIOLOGY | Facility: HOSPITAL | Age: 54
Discharge: HOME | End: 2024-10-14
Admitting: PHYSICIAN ASSISTANT
Payer: MEDICAID

## 2024-10-14 ENCOUNTER — TRANSCRIBE ORDERS (OUTPATIENT)
Dept: ADMINISTRATIVE | Facility: HOSPITAL | Age: 54
End: 2024-10-14
Payer: MEDICAID

## 2024-10-14 DIAGNOSIS — M25.512 LEFT SHOULDER PAIN, UNSPECIFIED CHRONICITY: Primary | ICD-10-CM

## 2024-10-14 DIAGNOSIS — M25.512 LEFT SHOULDER PAIN, UNSPECIFIED CHRONICITY: ICD-10-CM

## 2024-10-14 PROCEDURE — 73030 X-RAY EXAM OF SHOULDER: CPT

## 2024-10-14 PROCEDURE — 73030 X-RAY EXAM OF SHOULDER: CPT | Performed by: RADIOLOGY

## 2024-10-18 ENCOUNTER — OFFICE VISIT (OUTPATIENT)
Dept: ORTHOPEDIC SURGERY | Facility: CLINIC | Age: 54
End: 2024-10-18
Payer: MEDICAID

## 2024-10-18 VITALS
SYSTOLIC BLOOD PRESSURE: 142 MMHG | WEIGHT: 198 LBS | HEIGHT: 67 IN | BODY MASS INDEX: 31.08 KG/M2 | HEART RATE: 86 BPM | DIASTOLIC BLOOD PRESSURE: 77 MMHG

## 2024-10-18 DIAGNOSIS — M75.82 TENDINITIS OF LEFT ROTATOR CUFF: Primary | ICD-10-CM

## 2024-10-18 RX ORDER — AMLODIPINE BESYLATE 10 MG/1
1 TABLET ORAL DAILY
COMMUNITY
Start: 2023-09-19

## 2024-10-18 RX ORDER — TRAZODONE HYDROCHLORIDE 50 MG/1
1 TABLET, FILM COATED ORAL
COMMUNITY

## 2024-10-18 RX ADMIN — LIDOCAINE HYDROCHLORIDE 5 ML: 10 INJECTION, SOLUTION EPIDURAL; INFILTRATION; INTRACAUDAL; PERINEURAL at 11:18

## 2024-10-18 RX ADMIN — METHYLPREDNISOLONE ACETATE 80 MG: 80 INJECTION, SUSPENSION INTRA-ARTICULAR; INTRALESIONAL; INTRAMUSCULAR; SOFT TISSUE at 11:18

## 2024-10-25 ENCOUNTER — PATIENT ROUNDING (BHMG ONLY) (OUTPATIENT)
Dept: ORTHOPEDIC SURGERY | Facility: CLINIC | Age: 54
End: 2024-10-25
Payer: MEDICAID

## 2024-10-25 NOTE — PROGRESS NOTES
October 25, 2024    Hello, may I speak with Toy Clemons?    My name is Disha LIU,      I am  with [facility] [place]  [facility]  [address]  [place] [address] [phone].    Before we get started may I verify your date of birth? 1970    I am calling to officially welcome you to our practice and ask about your recent visit. Is this a good time to talk? yes    Tell me about your visit with us. What things went well?  Everything went fine.        We're always looking for ways to make our patients' experiences even better. Do you have recommendations on ways we may improve?  no    Overall were you satisfied with your first visit to our practice? yes       I appreciate you taking the time to speak with me today. Is there anything else I can do for you? no      Thank you, and have a great day.

## 2024-11-01 RX ORDER — LIDOCAINE HYDROCHLORIDE 10 MG/ML
5 INJECTION, SOLUTION EPIDURAL; INFILTRATION; INTRACAUDAL; PERINEURAL
Status: COMPLETED | OUTPATIENT
Start: 2024-10-18 | End: 2024-10-18

## 2024-11-01 RX ORDER — METHYLPREDNISOLONE ACETATE 80 MG/ML
80 INJECTION, SUSPENSION INTRA-ARTICULAR; INTRALESIONAL; INTRAMUSCULAR; SOFT TISSUE
Status: COMPLETED | OUTPATIENT
Start: 2024-10-18 | End: 2024-10-18

## 2024-11-01 NOTE — PROGRESS NOTES
[facility] [place] Orthopaedic Surgery New Patient Visit          Patient: Toy Clemons  YOB: 1970  Date of Encounter: 10/18/2024  PCP: Sloan Yo      Subjective     Chief Complaint   Patient presents with    Left Shoulder - Initial Evaluation, Pain           History of Present Illness:     Toy Clemons is a 54 y.o. male presents today as result of no specific injury.  The patient has had pain into the left shoulder that began approximately 3 to 4 months ago.  The patient caught himself on a ladder while traveling down on the rungs however he did not fall.  Since that time the patient has had pain and stiffness and swelling and difficulty with overhead range of motion.  He has undergone no conservative treatment options outside of activity modification.  Denies any paresthesias or significant weakness.        Patient Active Problem List   Diagnosis    Encounter for screening colonoscopy     Past Medical History:   Diagnosis Date    Disease of thyroid gland     Hypertension     Sleep apnea      Past Surgical History:   Procedure Laterality Date    COLONOSCOPY N/A 3/6/2020    Procedure: COLONOSCOPY;  Surgeon: Juan Ramon Roper MD;  Location: [facility];  Service: Gastroenterology;  Laterality: N/A;    HERNIA REPAIR      SINUS SURGERY       Social History     Occupational History    Not on file   Tobacco Use    Smoking status: Every Day     Current packs/day: 1.00     Average packs/day: 1 pack/day for 30.0 years (30.0 ttl pk-yrs)     Types: Cigarettes    Smokeless tobacco: Current   Vaping Use    Vaping status: Never Used   Substance and Sexual Activity    Alcohol use: Not Currently     Comment: QUIT FEB 2020    Drug use: Never    Sexual activity: Defer    Toy Clemons  reports that he has been smoking cigarettes. He has a 30 pack-year smoking history. He uses smokeless tobacco. I have educated him on the risk of diseases from using tobacco products such as cancer, COPD, and heart disease.     I advised him  "to quit and he is not willing to quit.    I spent 3  minutes counseling the patient.          Social History     Social History Narrative    Not on file     History reviewed. No pertinent family history.  Current Outpatient Medications   Medication Sig Dispense Refill    amLODIPine (NORVASC) 10 MG tablet Take 1 tablet by mouth Daily.      levothyroxine (SYNTHROID, LEVOTHROID) 200 MCG tablet Take 1 tablet by mouth Daily.      lisinopril (PRINIVIL,ZESTRIL) 20 MG tablet Take 1 tablet by mouth Daily. FOR 30 DAYS      omeprazole (priLOSEC) 20 MG capsule Take 1 capsule by mouth Every Morning.      traZODone (DESYREL) 50 MG tablet Take 1 tablet by mouth every night at bedtime.      methylPREDNISolone (MEDROL) 4 MG dose pack Take as directed on package instructions. (Patient not taking: Reported on 10/18/2024) 21 tablet 0    SUPREP BOWEL PREP KIT 17.5-3.13-1.6 GM/177ML solution oral solution Dispense one Kit        Sig: Used as Directed (Patient not taking: Reported on 10/18/2024) 2 bottle 0     No current facility-administered medications for this visit.     No Known Allergies         Review of Systems   Constitutional: Negative.   HENT: Negative.     Eyes: Negative.    Cardiovascular: Negative.    Respiratory: Negative.          Sleep apnea   Endocrine: Negative.    Hematologic/Lymphatic: Negative.    Skin: Negative.    Musculoskeletal:  Positive for joint pain.        Pertinent positives listed in HPI   Gastrointestinal: Negative.    Genitourinary: Negative.    Neurological: Negative.    Psychiatric/Behavioral: Negative.     Allergic/Immunologic: Negative.          Objective      Vitals:    10/18/24 0834   BP: 142/77   Pulse: 86   Weight: 89.8 kg (198 lb)   Height: 170.2 cm (67\")      BMI is >= 30 and <35. (Class 1 Obesity). The following options were offered after discussion;: exercise counseling/recommendations and nutrition counseling/recommendations      Physical Exam  Vitals and nursing note reviewed. "   Constitutional:       General: He is not in acute distress.     Appearance: Normal appearance. He is not ill-appearing.   HENT:      Head: Normocephalic and atraumatic.      Right Ear: External ear normal.      Left Ear: External ear normal.      Nose: Nose normal.      Mouth/Throat:      Mouth: Mucous membranes are moist.      Pharynx: Oropharynx is clear.   Eyes:      Extraocular Movements: Extraocular movements intact.      Conjunctiva/sclera: Conjunctivae normal.      Pupils: Pupils are equal, round, and reactive to light.   Cardiovascular:      Rate and Rhythm: Normal rate.      Pulses: Normal pulses.   Pulmonary:      Effort: Pulmonary effort is normal.   Abdominal:      General: There is no distension.   Musculoskeletal:      Cervical back: Normal range of motion. No rigidity.      Comments: Left shoulder on examination today reveals anterior lateral shoulder tenderness to palpation.  The patient has forward elevation and abduction 100 degrees.  Internal rotation to the iliac crest.  No pain or instability upon external and internal rotation at side.  Jobes maneuver painful with strength intact.  Speed's Test negative.  Empty can test painful with strength intact.   Skin:     General: Skin is warm and dry.      Capillary Refill: Capillary refill takes less than 2 seconds.   Neurological:      General: No focal deficit present.      Mental Status: He is alert and oriented to person, place, and time.   Psychiatric:         Mood and Affect: Mood normal.         Behavior: Behavior normal.                 Radiology:        XR Shoulder 2+ View Left    Result Date: 10/14/2024    No acute findings in the left shoulder.   This report was finalized on 10/14/2024 10:20 AM by Dr. Moe Murray MD.           Assessment/Plan        ICD-10-CM ICD-9-CM   1. Tendinitis of left rotator cuff  M75.82 726.10             Large Joint Arthrocentesis: L subacromial bursa  Date/Time: 10/18/2024 11:18 AM  Consent given by:  patient  Site marked: site marked  Supporting Documentation  Indications: pain and diagnostic evaluation   Procedure Details  Location: shoulder - L subacromial bursa  Needle size: 25 G  Approach: lateral  Medications administered: 80 mg methylPREDNISolone acetate 80 MG/ML; 5 mL lidocaine PF 1% 1 %  Patient tolerance: patient tolerated the procedure well with no immediate complications                      This document was signed by Romel Salas PA-C November 1, 2024     CC: Sloan Yo      Dictated Utilizing Dragon Dictation:   Please note that portions of this note were completed with a voice recognition program.   Part of this note may be an electronic transcription/translation of spoken language to printed text using the Dragon Dictation System.

## 2024-11-08 ENCOUNTER — OFFICE VISIT (OUTPATIENT)
Dept: ORTHOPEDIC SURGERY | Facility: CLINIC | Age: 54
End: 2024-11-08
Payer: MEDICAID

## 2024-11-08 VITALS — WEIGHT: 198 LBS | BODY MASS INDEX: 31.08 KG/M2 | HEIGHT: 67 IN

## 2024-11-08 DIAGNOSIS — M25.512 LEFT SHOULDER PAIN, UNSPECIFIED CHRONICITY: ICD-10-CM

## 2024-11-08 DIAGNOSIS — M75.82 TENDINITIS OF LEFT ROTATOR CUFF: Primary | ICD-10-CM

## 2024-11-08 PROCEDURE — 99213 OFFICE O/P EST LOW 20 MIN: CPT | Performed by: PHYSICIAN ASSISTANT

## 2024-11-08 NOTE — PROGRESS NOTES
[facility] [place] Orthopaedic Surgery New Patient Visit          Patient: Toy Clemons  YOB: 1970  Date of Encounter: 11/8/2024  PCP: Sloan Yo      Subjective     Chief Complaint   Patient presents with    Left Shoulder - Follow-up, Pain           History of Present Illness:     Toy Clemons is a 54 y.o. male presents today for follow-up evaluation of ongoing left shoulder pain following an incidence in which she had attempted to catch himself on a ladder while traveling down the rungs.  He states he did not fall and hit the ground however he did catch himself in a precarious position in reference to the shoulder.  Since that time he has undergone physician directed home therapy exercises with no significant alleviation, avoidance of aggravating factors, activity modification, NSAIDs, injections, etc. last office visit the patient received subacromial injection with no lasting benefit.  Patient denies any current paresthesias.        Patient Active Problem List   Diagnosis    Encounter for screening colonoscopy     Past Medical History:   Diagnosis Date    Disease of thyroid gland     Hypertension     Sleep apnea      Past Surgical History:   Procedure Laterality Date    COLONOSCOPY N/A 3/6/2020    Procedure: COLONOSCOPY;  Surgeon: Juan Ramon Roper MD;  Location: [facility];  Service: Gastroenterology;  Laterality: N/A;    HERNIA REPAIR      SINUS SURGERY       Social History     Occupational History    Not on file   Tobacco Use    Smoking status: Every Day     Current packs/day: 1.00     Average packs/day: 1 pack/day for 30.0 years (30.0 ttl pk-yrs)     Types: Cigarettes    Smokeless tobacco: Current   Vaping Use    Vaping status: Never Used   Substance and Sexual Activity    Alcohol use: Not Currently     Comment: QUIT FEB 2020    Drug use: Never    Sexual activity: Defer    Toy Clemons  reports that he has been smoking cigarettes. He has a 30 pack-year smoking history. He uses smokeless tobacco. I  "have educated him on the risk of diseases from using tobacco products such as cancer, COPD, and heart disease.     I advised him to quit and he is not willing to quit.    I spent 3  minutes counseling the patient.          Social History     Social History Narrative    Not on file     History reviewed. No pertinent family history.  Current Outpatient Medications   Medication Sig Dispense Refill    amLODIPine (NORVASC) 10 MG tablet Take 1 tablet by mouth Daily.      levothyroxine (SYNTHROID, LEVOTHROID) 200 MCG tablet Take 1 tablet by mouth Daily.      lisinopril (PRINIVIL,ZESTRIL) 20 MG tablet Take 1 tablet by mouth Daily. FOR 30 DAYS      methylPREDNISolone (MEDROL) 4 MG dose pack Take as directed on package instructions. 21 tablet 0    omeprazole (priLOSEC) 20 MG capsule Take 1 capsule by mouth Every Morning.      SUPREP BOWEL PREP KIT 17.5-3.13-1.6 GM/177ML solution oral solution Dispense one Kit        Sig: Used as Directed 2 bottle 0    traZODone (DESYREL) 50 MG tablet Take 1 tablet by mouth every night at bedtime.       No current facility-administered medications for this visit.     No Known Allergies         Review of Systems   Constitutional: Negative.   HENT: Negative.     Eyes: Negative.    Cardiovascular: Negative.    Respiratory: Negative.          Sleep apnea   Endocrine: Negative.    Hematologic/Lymphatic: Negative.    Skin: Negative.    Musculoskeletal:  Positive for joint pain.        Pertinent positives listed in HPI   Gastrointestinal: Negative.    Genitourinary: Negative.    Neurological: Negative.    Psychiatric/Behavioral: Negative.     Allergic/Immunologic: Negative.          Objective      Vitals:    11/08/24 0838   Weight: 89.8 kg (198 lb)   Height: 170.2 cm (67\")      BMI is >= 30 and <35. (Class 1 Obesity). The following options were offered after discussion;: exercise counseling/recommendations and nutrition counseling/recommendations      Physical Exam  Vitals and nursing note reviewed. "   Constitutional:       General: He is not in acute distress.     Appearance: Normal appearance. He is not ill-appearing.   HENT:      Head: Normocephalic and atraumatic.      Right Ear: External ear normal.      Left Ear: External ear normal.      Nose: Nose normal.      Mouth/Throat:      Mouth: Mucous membranes are moist.      Pharynx: Oropharynx is clear.   Eyes:      Extraocular Movements: Extraocular movements intact.      Conjunctiva/sclera: Conjunctivae normal.      Pupils: Pupils are equal, round, and reactive to light.   Cardiovascular:      Rate and Rhythm: Normal rate.      Pulses: Normal pulses.   Pulmonary:      Effort: Pulmonary effort is normal.   Abdominal:      General: There is no distension.   Musculoskeletal:      Cervical back: Normal range of motion. No rigidity.      Comments: Left shoulder on examination today reveals anterior lateral shoulder tenderness to palpation.  The patient has forward elevation and abduction 100 degrees.  Internal rotation to the iliac crest.  No pain or instability upon external and internal rotation at side.  Jobes maneuver painful with strength intact.  Speed's Test negative.  Empty can test painful with strength intact.   Skin:     General: Skin is warm and dry.      Capillary Refill: Capillary refill takes less than 2 seconds.   Neurological:      General: No focal deficit present.      Mental Status: He is alert and oriented to person, place, and time.   Psychiatric:         Mood and Affect: Mood normal.         Behavior: Behavior normal.                 Radiology:        XR Shoulder 2+ View Left    Result Date: 10/14/2024    No acute findings in the left shoulder.   This report was finalized on 10/14/2024 10:20 AM by Dr. Moe Murray MD.           Assessment/Plan        ICD-10-CM ICD-9-CM   1. Tendinitis of left rotator cuff  M75.82 726.10   2. Left shoulder pain, unspecified chronicity  M25.512 719.41         54-year-old male with notable ongoing left  shoulder pain following an acute injury several months ago.  Patient has had no absence of alleviation or continuation of pain symptoms following conservative physical therapy, medication, injections, etc.  Secondary to the recalcitrance and concern for internal derangement and rotator cuff involvement the patient will undergo diagnostic MRI left shoulder.  The patient will return back upon completion for further evaluation.                        This document was signed by Romel Salas PA-C November 8, 2024     CC: Sloan Yo      Dictated Utilizing Dragon Dictation:   Please note that portions of this note were completed with a voice recognition program.   Part of this note may be an electronic transcription/translation of spoken language to printed text using the Dragon Dictation System.

## 2024-12-15 ENCOUNTER — HOSPITAL ENCOUNTER (OUTPATIENT)
Dept: MRI IMAGING | Facility: HOSPITAL | Age: 54
Discharge: HOME | End: 2024-12-15
Admitting: PHYSICIAN ASSISTANT
Payer: MEDICAID

## 2024-12-15 DIAGNOSIS — M75.82 TENDINITIS OF LEFT ROTATOR CUFF: ICD-10-CM

## 2024-12-15 PROCEDURE — 73221 MRI JOINT UPR EXTREM W/O DYE: CPT

## 2024-12-15 PROCEDURE — 73221 MRI JOINT UPR EXTREM W/O DYE: CPT | Performed by: RADIOLOGY

## 2025-01-15 ENCOUNTER — OFFICE VISIT (OUTPATIENT)
Dept: ORTHOPEDIC SURGERY | Facility: CLINIC | Age: 55
End: 2025-01-15
Payer: MEDICAID

## 2025-01-15 VITALS — WEIGHT: 198 LBS | HEIGHT: 67 IN | BODY MASS INDEX: 31.08 KG/M2

## 2025-01-15 DIAGNOSIS — M75.82 TENDINITIS OF LEFT ROTATOR CUFF: Primary | ICD-10-CM

## 2025-01-15 RX ORDER — LIDOCAINE HYDROCHLORIDE 10 MG/ML
5 INJECTION, SOLUTION EPIDURAL; INFILTRATION; INTRACAUDAL; PERINEURAL
Status: COMPLETED | OUTPATIENT
Start: 2025-01-15 | End: 2025-01-15

## 2025-01-15 RX ORDER — METHYLPREDNISOLONE ACETATE 80 MG/ML
80 INJECTION, SUSPENSION INTRA-ARTICULAR; INTRALESIONAL; INTRAMUSCULAR; SOFT TISSUE
Status: COMPLETED | OUTPATIENT
Start: 2025-01-15 | End: 2025-01-15

## 2025-01-15 RX ORDER — QUETIAPINE FUMARATE 25 MG/1
25 TABLET, FILM COATED ORAL
COMMUNITY

## 2025-01-15 RX ADMIN — LIDOCAINE HYDROCHLORIDE 5 ML: 10 INJECTION, SOLUTION EPIDURAL; INFILTRATION; INTRACAUDAL; PERINEURAL at 10:46

## 2025-01-15 RX ADMIN — METHYLPREDNISOLONE ACETATE 80 MG: 80 INJECTION, SUSPENSION INTRA-ARTICULAR; INTRALESIONAL; INTRAMUSCULAR; SOFT TISSUE at 10:46

## 2025-01-15 NOTE — PROGRESS NOTES
[facility] [place] Orthopaedic Surgery Established Patient Visit          Patient: Toy Clemons  YOB: 1970  Date of Encounter: 1/15/2025  PCP: Sloan Yo      Subjective     Chief Complaint   Patient presents with    Left Shoulder - Follow-up, Pain           History of Present Illness:     Toy Clemons is a 54 y.o. male presents today for follow-up evaluation of ongoing left shoulder pain following an incidence in which he had attempted to catch himself on a ladder while traveling down the rungs.  He states he did not fall and hit the ground however he did catch himself in a precarious position in reference to the shoulder.  Since that time he has undergone physician directed home therapy exercises with no significant alleviation, avoidance of aggravating factors, activity modification, NSAIDs, injections, etc. patient presents today for diagnostic MRI results review and further evaluation.    Patient Active Problem List   Diagnosis    Encounter for screening colonoscopy     Past Medical History:   Diagnosis Date    Disease of thyroid gland     Hypertension     Sleep apnea      Past Surgical History:   Procedure Laterality Date    COLONOSCOPY N/A 3/6/2020    Procedure: COLONOSCOPY;  Surgeon: Juan Ramon Roper MD;  Location: [facility];  Service: Gastroenterology;  Laterality: N/A;    HERNIA REPAIR      SINUS SURGERY       Social History     Occupational History    Not on file   Tobacco Use    Smoking status: Every Day     Current packs/day: 1.00     Average packs/day: 1 pack/day for 30.0 years (30.0 ttl pk-yrs)     Types: Cigarettes    Smokeless tobacco: Current   Vaping Use    Vaping status: Never Used   Substance and Sexual Activity    Alcohol use: Not Currently     Comment: QUIT FEB 2020    Drug use: Never    Sexual activity: Defer    Toy Clemons  reports that he has been smoking cigarettes. He has a 30 pack-year smoking history. He uses smokeless tobacco. I have educated him on the risk of diseases  "from using tobacco products such as cancer, COPD, and heart disease.     I advised him to quit and he is not willing to quit.    I spent 3  minutes counseling the patient.          Social History     Social History Narrative    Not on file     History reviewed. No pertinent family history.  Current Outpatient Medications   Medication Sig Dispense Refill    amLODIPine (NORVASC) 10 MG tablet Take 1 tablet by mouth Daily.      levothyroxine (SYNTHROID, LEVOTHROID) 200 MCG tablet Take 1 tablet by mouth Daily.      lisinopril (PRINIVIL,ZESTRIL) 20 MG tablet Take 1 tablet by mouth Daily. FOR 30 DAYS      omeprazole (priLOSEC) 20 MG capsule Take 1 capsule by mouth Every Morning.      traZODone (DESYREL) 50 MG tablet Take 1 tablet by mouth every night at bedtime.      QUEtiapine (SEROquel) 25 MG tablet Take 1 tablet by mouth every night at bedtime.       No current facility-administered medications for this visit.     No Known Allergies         Review of Systems   Constitutional: Negative.   HENT: Negative.     Eyes: Negative.    Cardiovascular: Negative.    Respiratory: Negative.          Sleep apnea   Endocrine: Negative.    Hematologic/Lymphatic: Negative.    Skin: Negative.    Musculoskeletal:  Positive for joint pain.        Pertinent positives listed in HPI   Gastrointestinal: Negative.    Genitourinary: Negative.    Neurological: Negative.    Psychiatric/Behavioral: Negative.     Allergic/Immunologic: Negative.          Objective      Vitals:    01/15/25 1026   Weight: 89.8 kg (198 lb)   Height: 170.2 cm (67\")      BMI is >= 30 and <35. (Class 1 Obesity). The following options were offered after discussion;: exercise counseling/recommendations and nutrition counseling/recommendations      Physical Exam  Vitals and nursing note reviewed.   Constitutional:       General: He is not in acute distress.     Appearance: Normal appearance. He is not ill-appearing.   HENT:      Head: Normocephalic and atraumatic.      Right " Ear: External ear normal.      Left Ear: External ear normal.      Nose: Nose normal.      Mouth/Throat:      Mouth: Mucous membranes are moist.      Pharynx: Oropharynx is clear.   Eyes:      Extraocular Movements: Extraocular movements intact.      Conjunctiva/sclera: Conjunctivae normal.      Pupils: Pupils are equal, round, and reactive to light.   Cardiovascular:      Rate and Rhythm: Normal rate.      Pulses: Normal pulses.   Pulmonary:      Effort: Pulmonary effort is normal.   Abdominal:      General: There is no distension.   Musculoskeletal:      Cervical back: Normal range of motion. No rigidity.      Comments: Left shoulder on examination today reveals anterior lateral shoulder tenderness to palpation.  The patient has forward elevation and abduction 100 degrees.  Internal rotation to the iliac crest.  No pain or instability upon external and internal rotation at side.  Jobes maneuver painful with strength intact.  Speed's Test negative.  Empty can test painful with strength intact.   Skin:     General: Skin is warm and dry.      Capillary Refill: Capillary refill takes less than 2 seconds.   Neurological:      General: No focal deficit present.      Mental Status: He is alert and oriented to person, place, and time.   Psychiatric:         Mood and Affect: Mood normal.         Behavior: Behavior normal.               Radiology:        MRI Shoulder Left Without Contrast    Result Date: 12/15/2024    Nonspecific cyst overlying the supraspinatus muscle that may represent some sequelae of chronic tearing. The distal supraspinatus tendon shows evidence of tendinosis and bursal surface partial tearing.  This report was finalized on 12/15/2024 3:36 PM by Dr. Moe Murray MD.             Assessment/Plan      No diagnosis found.      54-year-old male with ongoing left shoulder pain MRI confirmed rotator cuff tendinosis with no identifiable full-thickness tear.  Further discussion was had with the patient and  today he was provided with a subacromial injection with 80 mg Depo-Medrol with lidocaine block injected into the intra-articular space of the left shoulder.  The patient tolerated this procedure well.  No direct indicative surgical indication currently.  Patient return back in 3 weeks for further evaluation.  If continuation of pain or still continued symptoms we discussed potential of repeat injection versus continuation of conservative modalities.  Patient is agreeable        Large Joint Arthrocentesis: L subacromial bursa  Date/Time: 1/15/2025 10:46 AM  Consent given by: patient  Site marked: site marked  Supporting Documentation  Indications: pain and diagnostic evaluation   Procedure Details  Location: shoulder - L subacromial bursa  Needle size: 25 G  Approach: lateral  Medications administered: 80 mg methylPREDNISolone acetate 80 MG/ML; 5 mL lidocaine PF 1% 1 %  Patient tolerance: patient tolerated the procedure well with no immediate complications                        This document was signed by Romel Salas PA-C January 15, 2025     CC: Sloan Yo      Dictated Utilizing Dragon Dictation:   Please note that portions of this note were completed with a voice recognition program.   Part of this note may be an electronic transcription/translation of spoken language to printed text using the Dragon Dictation System.

## 2025-02-05 ENCOUNTER — OFFICE VISIT (OUTPATIENT)
Dept: ORTHOPEDIC SURGERY | Facility: CLINIC | Age: 55
End: 2025-02-05
Payer: MEDICAID

## 2025-02-05 VITALS — BODY MASS INDEX: 31.08 KG/M2 | HEIGHT: 67 IN | WEIGHT: 198 LBS

## 2025-02-05 DIAGNOSIS — M75.82 TENDINITIS OF LEFT ROTATOR CUFF: Primary | ICD-10-CM

## 2025-02-05 PROCEDURE — 99213 OFFICE O/P EST LOW 20 MIN: CPT | Performed by: PHYSICIAN ASSISTANT

## 2025-02-05 RX ORDER — NAPROXEN 500 MG/1
500 TABLET ORAL 2 TIMES DAILY WITH MEALS
Qty: 60 TABLET | Refills: 2 | Status: SHIPPED | OUTPATIENT
Start: 2025-02-05

## 2025-02-05 NOTE — PROGRESS NOTES
[facility] Established Patient Visit          Patient: Toy Clemons  YOB: 1970  Date of Encounter: 2/5/2025  PCP: Sloan Yo      Subjective     Chief Complaint   Patient presents with    Left Shoulder - Follow-up, Pain           History of Present Illness:     Toy Clemons is a 54 y.o. male presents today for follow-up evaluation of ongoing left shoulder pain following an incidence in which he had attempted to catch himself on a ladder while traveling down the rungs.  He states he did not fall and hit the ground however he did catch himself in a precarious position in reference to the shoulder.  Since that time he has undergone physician directed home therapy exercises with no significant alleviation, avoidance of aggravating factors, activity modification, NSAIDs, injections, etc. most recent steroid injection has been notably efficacious with greater than 85 to 90% improvement of his pain symptoms.       Patient Active Problem List   Diagnosis    Encounter for screening colonoscopy     Past Medical History:   Diagnosis Date    Disease of thyroid gland     Hypertension     Sleep apnea      Past Surgical History:   Procedure Laterality Date    COLONOSCOPY N/A 3/6/2020    Procedure: COLONOSCOPY;  Surgeon: Juan Ramon Roper MD;  Location: [facility];  Service: Gastroenterology;  Laterality: N/A;    HERNIA REPAIR      SINUS SURGERY       Social History     Occupational History    Not on file   Tobacco Use    Smoking status: Every Day     Current packs/day: 1.00     Average packs/day: 1 pack/day for 30.0 years (30.0 ttl pk-yrs)     Types: Cigarettes    Smokeless tobacco: Current   Vaping Use    Vaping status: Never Used   Substance and Sexual Activity    Alcohol use: Not Currently     Comment: QUIT FEB 2020    Drug use: Never    Sexual activity: Defer    Toy Clemons  reports that he has been smoking cigarettes. He has a 30 pack-year smoking history. He uses smokeless tobacco. I  "have educated him on the risk of diseases from using tobacco products such as cancer, COPD, and heart disease.     I advised him to quit and he is not willing to quit.    I spent 3  minutes counseling the patient.          Social History     Social History Narrative    Not on file     History reviewed. No pertinent family history.  Current Outpatient Medications   Medication Sig Dispense Refill    amLODIPine (NORVASC) 10 MG tablet Take 1 tablet by mouth Daily.      levothyroxine (SYNTHROID, LEVOTHROID) 200 MCG tablet Take 1 tablet by mouth Daily.      lisinopril (PRINIVIL,ZESTRIL) 20 MG tablet Take 1 tablet by mouth Daily. FOR 30 DAYS      omeprazole (priLOSEC) 20 MG capsule Take 1 capsule by mouth Every Morning.      QUEtiapine (SEROquel) 25 MG tablet Take 1 tablet by mouth every night at bedtime.      traZODone (DESYREL) 50 MG tablet Take 1 tablet by mouth every night at bedtime.       No current facility-administered medications for this visit.     No Known Allergies         Review of Systems   Constitutional: Negative.   HENT: Negative.     Eyes: Negative.    Cardiovascular: Negative.    Respiratory: Negative.          Sleep apnea   Endocrine: Negative.    Hematologic/Lymphatic: Negative.    Skin: Negative.    Musculoskeletal:  Positive for joint pain.        Pertinent positives listed in HPI   Gastrointestinal: Negative.    Genitourinary: Negative.    Neurological: Negative.    Psychiatric/Behavioral: Negative.     Allergic/Immunologic: Negative.          Objective      Vitals:    02/05/25 0918   Weight: 89.8 kg (198 lb)   Height: 170.2 cm (67\")      BMI is >= 30 and <35. (Class 1 Obesity). The following options were offered after discussion;: exercise counseling/recommendations and nutrition counseling/recommendations      Physical Exam  Vitals and nursing note reviewed.   Constitutional:       General: He is not in acute distress.     Appearance: Normal appearance. He is not ill-appearing.   HENT:      Head: " Normocephalic and atraumatic.      Right Ear: External ear normal.      Left Ear: External ear normal.      Nose: Nose normal.      Mouth/Throat:      Mouth: Mucous membranes are moist.      Pharynx: Oropharynx is clear.   Eyes:      Extraocular Movements: Extraocular movements intact.      Conjunctiva/sclera: Conjunctivae normal.      Pupils: Pupils are equal, round, and reactive to light.   Cardiovascular:      Rate and Rhythm: Normal rate.      Pulses: Normal pulses.   Pulmonary:      Effort: Pulmonary effort is normal.   Abdominal:      General: There is no distension.   Musculoskeletal:      Cervical back: Normal range of motion. No rigidity.      Comments: Left shoulder on examination today reveals anterior lateral shoulder tenderness to palpation.  The patient has forward elevation and abduction 100 degrees.  Internal rotation to the iliac crest.  No pain or instability upon external and internal rotation at side.  Jobes maneuver mildly painful with strength intact.  Speed's Test negative.  Empty can test painful with strength intact.   Skin:     General: Skin is warm and dry.      Capillary Refill: Capillary refill takes less than 2 seconds.   Neurological:      General: No focal deficit present.      Mental Status: He is alert and oriented to person, place, and time.   Psychiatric:         Mood and Affect: Mood normal.         Behavior: Behavior normal.                 Radiology:        MRI Shoulder Left Without Contrast    Result Date: 12/15/2024    Nonspecific cyst overlying the supraspinatus muscle that may represent some sequelae of chronic tearing. The distal supraspinatus tendon shows evidence of tendinosis and bursal surface partial tearing.  This report was finalized on 12/15/2024 3:36 PM by Dr. Moe Murray MD.             Assessment/Plan        ICD-10-CM ICD-9-CM   1. Tendinitis of left rotator cuff  M75.82 726.10       54-year-old male with ongoing left shoulder pain and rotator cuff tendinosis  confirmed via MRI.  The patient has responded well with the previous subacromial injection with upwards to 85 to 90% improvement of pain and symptoms.  As result of this the patient was provided with a prescription for Naprosyn 500 mg 1 p.o. twice daily dispense #60 with 2 refills.  Patient will implement this to help to alleviate the remainder of his pain and symptoms.  He will still focus on activity modification and avoidance of aggravating factors.  The patient was instructed to return back in 4 weeks.  No direct surgical indication.                  This document was signed by Romel Salas PA-C February 5, 2025     CC: Sloan Yo      Dictated Utilizing Dragon Dictation:   Please note that portions of this note were completed with a voice recognition program.   Part of this note may be an electronic transcription/translation of spoken language to printed text using the Dragon Dictation System.

## 2025-02-25 ENCOUNTER — HOSPITAL ENCOUNTER (EMERGENCY)
Facility: HOSPITAL | Age: 55
Discharge: HOME | End: 2025-02-25
Attending: STUDENT IN AN ORGANIZED HEALTH CARE EDUCATION/TRAINING PROGRAM
Payer: MEDICAID

## 2025-02-25 VITALS
RESPIRATION RATE: 20 BRPM | DIASTOLIC BLOOD PRESSURE: 94 MMHG | SYSTOLIC BLOOD PRESSURE: 155 MMHG | WEIGHT: 219 LBS | HEART RATE: 104 BPM | TEMPERATURE: 98.5 F | BODY MASS INDEX: 34.37 KG/M2 | HEIGHT: 67 IN | OXYGEN SATURATION: 95 %

## 2025-02-25 DIAGNOSIS — T46.4X5A ANGIOTENSIN CONVERTING ENZYME INHIBITOR-AGGRAVATED ANGIOEDEMA, INITIAL ENCOUNTER: Primary | ICD-10-CM

## 2025-02-25 DIAGNOSIS — T78.3XXA ANGIOTENSIN CONVERTING ENZYME INHIBITOR-AGGRAVATED ANGIOEDEMA, INITIAL ENCOUNTER: Primary | ICD-10-CM

## 2025-02-25 LAB
ABO GROUP BLD: NORMAL
ABO GROUP BLD: NORMAL
B PARAPERT DNA SPEC QL NAA+PROBE: NOT DETECTED
B PERT DNA SPEC QL NAA+PROBE: NOT DETECTED
BH BB BLOOD EXPIRATION DATE: NORMAL
BH BB BLOOD EXPIRATION DATE: NORMAL
BH BB BLOOD TYPE BARCODE: 5100
BH BB BLOOD TYPE BARCODE: 5100
BH BB DISPENSE STATUS: NORMAL
BH BB DISPENSE STATUS: NORMAL
BH BB PRODUCT CODE: NORMAL
BH BB PRODUCT CODE: NORMAL
BH BB UNIT NUMBER: NORMAL
BH BB UNIT NUMBER: NORMAL
BLD GP AB SCN SERPL QL: NEGATIVE
C PNEUM DNA NPH QL NAA+NON-PROBE: NOT DETECTED
FLUAV SUBTYP SPEC NAA+PROBE: NOT DETECTED
FLUBV RNA ISLT QL NAA+PROBE: NOT DETECTED
HADV DNA SPEC NAA+PROBE: NOT DETECTED
HCOV 229E RNA SPEC QL NAA+PROBE: NOT DETECTED
HCOV HKU1 RNA SPEC QL NAA+PROBE: NOT DETECTED
HCOV NL63 RNA SPEC QL NAA+PROBE: NOT DETECTED
HCOV OC43 RNA SPEC QL NAA+PROBE: NOT DETECTED
HMPV RNA NPH QL NAA+NON-PROBE: NOT DETECTED
HPIV1 RNA ISLT QL NAA+PROBE: NOT DETECTED
HPIV2 RNA SPEC QL NAA+PROBE: NOT DETECTED
HPIV3 RNA NPH QL NAA+PROBE: NOT DETECTED
HPIV4 P GENE NPH QL NAA+PROBE: NOT DETECTED
M PNEUMO IGG SER IA-ACNC: NOT DETECTED
QT INTERVAL: 316 MS
QTC INTERVAL: 437 MS
RH BLD: POSITIVE
RH BLD: POSITIVE
RHINOVIRUS RNA SPEC NAA+PROBE: NOT DETECTED
RSV RNA NPH QL NAA+NON-PROBE: NOT DETECTED
S PYO AG THROAT QL: NEGATIVE
SARS-COV-2 RNA RESP QL NAA+PROBE: NOT DETECTED
T&S EXPIRATION DATE: NORMAL
UNIT  ABO: NORMAL
UNIT  ABO: NORMAL
UNIT  RH: NORMAL
UNIT  RH: NORMAL

## 2025-02-25 PROCEDURE — 86901 BLOOD TYPING SEROLOGIC RH(D): CPT

## 2025-02-25 PROCEDURE — 96372 THER/PROPH/DIAG INJ SC/IM: CPT

## 2025-02-25 PROCEDURE — 86900 BLOOD TYPING SEROLOGIC ABO: CPT | Performed by: STUDENT IN AN ORGANIZED HEALTH CARE EDUCATION/TRAINING PROGRAM

## 2025-02-25 PROCEDURE — 96375 TX/PRO/DX INJ NEW DRUG ADDON: CPT

## 2025-02-25 PROCEDURE — 25010000002 METHYLPREDNISOLONE PER 125 MG

## 2025-02-25 PROCEDURE — 96365 THER/PROPH/DIAG IV INF INIT: CPT

## 2025-02-25 PROCEDURE — 36430 TRANSFUSION BLD/BLD COMPNT: CPT

## 2025-02-25 PROCEDURE — 25010000002 DIPHENHYDRAMINE PER 50 MG

## 2025-02-25 PROCEDURE — 25010000002 ONDANSETRON PER 1 MG: Performed by: STUDENT IN AN ORGANIZED HEALTH CARE EDUCATION/TRAINING PROGRAM

## 2025-02-25 PROCEDURE — P9059 PLASMA, FRZ BETWEEN 8-24HOUR: HCPCS

## 2025-02-25 PROCEDURE — 86901 BLOOD TYPING SEROLOGIC RH(D): CPT | Performed by: STUDENT IN AN ORGANIZED HEALTH CARE EDUCATION/TRAINING PROGRAM

## 2025-02-25 PROCEDURE — 86927 PLASMA FRESH FROZEN: CPT

## 2025-02-25 PROCEDURE — 0202U NFCT DS 22 TRGT SARS-COV-2: CPT | Performed by: STUDENT IN AN ORGANIZED HEALTH CARE EDUCATION/TRAINING PROGRAM

## 2025-02-25 PROCEDURE — 99285 EMERGENCY DEPT VISIT HI MDM: CPT

## 2025-02-25 PROCEDURE — 87880 STREP A ASSAY W/OPTIC: CPT | Performed by: STUDENT IN AN ORGANIZED HEALTH CARE EDUCATION/TRAINING PROGRAM

## 2025-02-25 PROCEDURE — 25810000003 SODIUM CHLORIDE 0.9 % SOLUTION

## 2025-02-25 PROCEDURE — 25010000002 KETOROLAC TROMETHAMINE PER 15 MG: Performed by: STUDENT IN AN ORGANIZED HEALTH CARE EDUCATION/TRAINING PROGRAM

## 2025-02-25 PROCEDURE — 36415 COLL VENOUS BLD VENIPUNCTURE: CPT | Performed by: STUDENT IN AN ORGANIZED HEALTH CARE EDUCATION/TRAINING PROGRAM

## 2025-02-25 PROCEDURE — 86850 RBC ANTIBODY SCREEN: CPT | Performed by: STUDENT IN AN ORGANIZED HEALTH CARE EDUCATION/TRAINING PROGRAM

## 2025-02-25 PROCEDURE — 86900 BLOOD TYPING SEROLOGIC ABO: CPT

## 2025-02-25 PROCEDURE — 25010000002 EPINEPHRINE (ANAPHYLAXIS) 1 MG/ML SOLUTION

## 2025-02-25 PROCEDURE — 87081 CULTURE SCREEN ONLY: CPT | Performed by: STUDENT IN AN ORGANIZED HEALTH CARE EDUCATION/TRAINING PROGRAM

## 2025-02-25 PROCEDURE — 93005 ELECTROCARDIOGRAM TRACING: CPT | Performed by: STUDENT IN AN ORGANIZED HEALTH CARE EDUCATION/TRAINING PROGRAM

## 2025-02-25 RX ORDER — EPINEPHRINE 1 MG/ML
0.3 INJECTION, SOLUTION INTRAMUSCULAR; SUBCUTANEOUS ONCE
Status: COMPLETED | OUTPATIENT
Start: 2025-02-25 | End: 2025-02-25

## 2025-02-25 RX ORDER — LOSARTAN POTASSIUM 50 MG/1
50 TABLET ORAL DAILY
Qty: 30 TABLET | Refills: 0 | Status: SHIPPED | OUTPATIENT
Start: 2025-02-25

## 2025-02-25 RX ORDER — TRANEXAMIC ACID 10 MG/ML
1000 INJECTION, SOLUTION INTRAVENOUS ONCE
Status: COMPLETED | OUTPATIENT
Start: 2025-02-25 | End: 2025-02-25

## 2025-02-25 RX ORDER — DIPHENHYDRAMINE HYDROCHLORIDE 50 MG/ML
25 INJECTION INTRAMUSCULAR; INTRAVENOUS ONCE
Status: COMPLETED | OUTPATIENT
Start: 2025-02-25 | End: 2025-02-25

## 2025-02-25 RX ORDER — METHYLPREDNISOLONE SODIUM SUCCINATE 125 MG/2ML
INJECTION, POWDER, LYOPHILIZED, FOR SOLUTION INTRAMUSCULAR; INTRAVENOUS
Status: COMPLETED
Start: 2025-02-25 | End: 2025-02-25

## 2025-02-25 RX ORDER — KETOROLAC TROMETHAMINE 30 MG/ML
30 INJECTION, SOLUTION INTRAMUSCULAR; INTRAVENOUS ONCE
Status: COMPLETED | OUTPATIENT
Start: 2025-02-25 | End: 2025-02-25

## 2025-02-25 RX ORDER — FAMOTIDINE 10 MG/ML
INJECTION, SOLUTION INTRAVENOUS
Status: COMPLETED
Start: 2025-02-25 | End: 2025-02-25

## 2025-02-25 RX ORDER — DIPHENHYDRAMINE HYDROCHLORIDE 50 MG/ML
INJECTION INTRAMUSCULAR; INTRAVENOUS
Status: COMPLETED
Start: 2025-02-25 | End: 2025-02-25

## 2025-02-25 RX ORDER — SODIUM CHLORIDE 0.9 % (FLUSH) 0.9 %
10 SYRINGE (ML) INJECTION AS NEEDED
Status: DISCONTINUED | OUTPATIENT
Start: 2025-02-25 | End: 2025-02-25 | Stop reason: HOSPADM

## 2025-02-25 RX ORDER — ONDANSETRON 2 MG/ML
4 INJECTION INTRAMUSCULAR; INTRAVENOUS ONCE
Status: COMPLETED | OUTPATIENT
Start: 2025-02-25 | End: 2025-02-25

## 2025-02-25 RX ORDER — EPINEPHRINE 1 MG/ML
INJECTION, SOLUTION INTRAMUSCULAR; SUBCUTANEOUS
Status: COMPLETED
Start: 2025-02-25 | End: 2025-02-25

## 2025-02-25 RX ORDER — FAMOTIDINE 10 MG/ML
20 INJECTION, SOLUTION INTRAVENOUS ONCE
Status: COMPLETED | OUTPATIENT
Start: 2025-02-25 | End: 2025-02-25

## 2025-02-25 RX ADMIN — FAMOTIDINE 20 MG: 10 INJECTION, SOLUTION INTRAVENOUS at 00:15

## 2025-02-25 RX ADMIN — DIPHENHYDRAMINE HYDROCHLORIDE 25 MG: 50 INJECTION INTRAMUSCULAR; INTRAVENOUS at 00:14

## 2025-02-25 RX ADMIN — METHYLPREDNISOLONE SODIUM SUCCINATE 125 MG: 125 INJECTION INTRAMUSCULAR; INTRAVENOUS at 00:14

## 2025-02-25 RX ADMIN — SODIUM CHLORIDE 1000 ML: 9 INJECTION, SOLUTION INTRAVENOUS at 00:54

## 2025-02-25 RX ADMIN — KETOROLAC TROMETHAMINE 30 MG: 30 INJECTION, SOLUTION INTRAMUSCULAR; INTRAVENOUS at 02:20

## 2025-02-25 RX ADMIN — EPINEPHRINE 0.3 MG: 1 INJECTION, SOLUTION INTRAMUSCULAR; SUBCUTANEOUS at 00:12

## 2025-02-25 RX ADMIN — TRANEXAMIC ACID 1000 MG: 10 INJECTION, SOLUTION INTRAVENOUS at 00:19

## 2025-02-25 RX ADMIN — EPINEPHRINE 0.3 MG: 1 INJECTION INTRAMUSCULAR; INTRAVENOUS; SUBCUTANEOUS at 00:12

## 2025-02-25 RX ADMIN — DIPHENHYDRAMINE HYDROCHLORIDE 25 MG: 50 INJECTION, SOLUTION INTRAMUSCULAR; INTRAVENOUS at 00:14

## 2025-02-25 RX ADMIN — ONDANSETRON 4 MG: 2 INJECTION INTRAMUSCULAR; INTRAVENOUS at 02:20

## 2025-02-25 NOTE — DISCHARGE INSTRUCTIONS
You absolutely must discontinue your lisinopril and never take any ACE inhibitor medication again ;as you could have and will likely have a recurrent episode of angioedema that can be life-threatening.     losartan 50 mg once daily x 30 days - take every morning- BP replacement for lisinopril     Please notify your PCP of follow-up in the next 2 to 3 days to reestablish and change of blood pressure medications

## 2025-02-25 NOTE — ED PROVIDER NOTES
Subjective   History of Present Illness    Review of Systems    Past Medical History:   Diagnosis Date    Disease of thyroid gland     Hypertension     Sleep apnea        Allergies   Allergen Reactions    Ace Inhibitors Angioedema       Past Surgical History:   Procedure Laterality Date    COLONOSCOPY N/A 3/6/2020    Procedure: COLONOSCOPY;  Surgeon: Juan Ramon Roper MD;  Location: [facility];  Service: Gastroenterology;  Laterality: N/A;    HERNIA REPAIR      SINUS SURGERY         No family history on file.    Social History     Socioeconomic History    Marital status:    Tobacco Use    Smoking status: Every Day     Current packs/day: 1.00     Average packs/day: 1 pack/day for 30.0 years (30.0 ttl pk-yrs)     Types: Cigarettes    Smokeless tobacco: Current   Vaping Use    Vaping status: Never Used   Substance and Sexual Activity    Alcohol use: Not Currently     Comment: QUIT FEB 2020    Drug use: Never    Sexual activity: Defer           Objective   Physical Exam  Vitals and nursing note reviewed.   Constitutional:       General: He is in acute distress.      Appearance: He is well-developed. He is ill-appearing and toxic-appearing. He is not diaphoretic.      Comments: Patient is awake alert and oriented on arrival on air and can handle oral secretions.    Is grossly erythematous predominantly on the head neck and anterior chest with slight erythema in the forearms bilaterally.  Patient has significant neck soft tissue swelling and perioral swelling as well as tongue swelling but he is able to handle his oral secretions at this time.    Patient occasionally has breathiness when he speaks but shows no evidence of stridor   HENT:      Head: Normocephalic and atraumatic.      Right Ear: External ear normal.      Left Ear: External ear normal.      Nose: Nose normal.   Eyes:      Extraocular Movements: Extraocular movements intact.      Conjunctiva/sclera: Conjunctivae normal.      Pupils: Pupils are equal,  round, and reactive to light.   Neck:      Vascular: No JVD.      Trachea: No tracheal deviation.   Cardiovascular:      Rate and Rhythm: Regular rhythm. Tachycardia present.      Heart sounds: Normal heart sounds. No murmur heard.  Pulmonary:      Effort: Pulmonary effort is normal. No respiratory distress.      Breath sounds: Normal breath sounds. No wheezing.   Abdominal:      General: Bowel sounds are normal.      Palpations: Abdomen is soft.      Tenderness: There is no abdominal tenderness.   Musculoskeletal:         General: No deformity. Normal range of motion.      Cervical back: Normal range of motion and neck supple.   Skin:     General: Skin is warm and dry.      Coloration: Skin is not pale.      Findings: Erythema present. No rash.   Neurological:      General: No focal deficit present.      Mental Status: He is alert and oriented to person, place, and time.      Cranial Nerves: No cranial nerve deficit.   Psychiatric:         Behavior: Behavior normal.         Thought Content: Thought content normal.         Critical Care    Performed by: Danuta Tolentino DO  Authorized by: Danuta Tolentino DO    Critical care provider statement:     Critical care time (minutes):  35    Critical care time was exclusive of:  Separately billable procedures and treating other patients and teaching time    Critical care was necessary to treat or prevent imminent or life-threatening deterioration of the following conditions:  Cardiac failure, respiratory failure, shock and circulatory failure    Critical care was time spent personally by me on the following activities:  Blood draw for specimens, development of treatment plan with patient or surrogate, evaluation of patient's response to treatment, examination of patient, interpretation of cardiac output measurements, obtaining history from patient or surrogate, ordering and performing treatments and interventions, ordering and review of laboratory studies, ordering and review  of radiographic studies, pulse oximetry, re-evaluation of patient's condition and review of old charts             ED Course  ED Course as of 02/25/25 0500   Tue Feb 25, 2025   0144 Patient is doing significantly better after starting TXA and FFP  Perioral swelling has mostly regressed as well as tongue swelling that was at least 50% larger from his baseline size.    He does complain of a headache that he had prior to earlier in the night at this time patient can still handle oral secretions [LK]   0439 Patient has continued to be monitored in the ER on cardiac telemetry patient continues to have resolving symptoms.  Patient was negative for respiratory as well as pharyngitis overall gross oropharyngeal swelling was consistent with angioedema reaction to lisinopril which has been added to patient's chart.  He was counseled regarding blood pressure medication changes and to notify any medical provider in the future of his history of ACE induced angioedema.  Electronically signed by Danuta Tolentino DO, 02/25/25, 4:41 AM EST.   [LK]      ED Course User Index  [LK] Danuta Tolentino DO                                                       Medical Decision Making  Problems Addressed:  Angiotensin converting enzyme inhibitor-aggravated angioedema, initial encounter: complicated acute illness or injury    Amount and/or Complexity of Data Reviewed  ECG/medicine tests: ordered.    Risk  Prescription drug management.  Parenteral controlled substances.  Drug therapy requiring intensive monitoring for toxicity.    Critical Care  Total time providing critical care: 35 minutes        Final diagnoses:   Angiotensin converting enzyme inhibitor-aggravated angioedema, initial encounter       ED Disposition  ED Disposition       ED Disposition   Discharge    Condition   Stable    Comment   --               Sloan Yo  [address] [place] [address] [id number] [phone]               Medication List        New  Prescriptions      losartan 50 MG tablet  Commonly known as: COZAAR  Take 1 tablet by mouth Daily.               Where to Get Your Medications        These medications were sent to [facility] [address] [place] [address]  [address] [address] - [phone]  - [phone]  [address] [address]      Phone: [phone]   losartan 50 MG tablet            Danuta Tolentino DO  02/25/25 5223

## 2025-02-27 LAB — BACTERIA SPEC AEROBE CULT: NORMAL

## 2025-04-17 ENCOUNTER — OFFICE VISIT (OUTPATIENT)
Dept: PULMONOLOGY | Facility: CLINIC | Age: 55
End: 2025-04-17
Payer: MEDICAID

## 2025-04-17 VITALS
SYSTOLIC BLOOD PRESSURE: 138 MMHG | HEIGHT: 67 IN | HEART RATE: 91 BPM | BODY MASS INDEX: 34.21 KG/M2 | OXYGEN SATURATION: 96 % | WEIGHT: 218 LBS | DIASTOLIC BLOOD PRESSURE: 72 MMHG | TEMPERATURE: 98.2 F

## 2025-04-17 DIAGNOSIS — G47.31 CENTRAL SLEEP APNEA: Primary | ICD-10-CM

## 2025-04-17 DIAGNOSIS — F17.210 CIGARETTE NICOTINE DEPENDENCE WITHOUT COMPLICATION: ICD-10-CM

## 2025-04-17 DIAGNOSIS — E66.9 OBESITY (BMI 30-39.9): ICD-10-CM

## 2025-04-17 NOTE — PROGRESS NOTES
"    Subjective      Chief Complaint  Sleep Apnea    Subjective      History of Present Illness  Toy Clemons is a 55 y.o. male who presents today to [facility] with past medical history of hypothyroidism, essential hypertension, BONITA, and tobacco abuse who presents today for Sleep Apnea. This visit is a initial evaluation  appointment.     Sleep Apnea:  Patient was referred by his PCP for sleep apnea. Patient reports that he had a sleep study over 20 years ago at [facility] and was told that he has central sleep apnea. He states that he was put on CPAP at that time but his machine stopped working and when he tried getting a replacement device his sleep study couldn't be retrieved so insurance wouldn't cover it. He states that he bought a new CPAP device and states the setting is on 21 but doesn't feel that it's working as well as his old machine. He also reports that he has started having symptoms similar to when he did when he wasn't wearing a CPAP device with frequent nighttime awakenings and feeling tired and fatigued throughout the day. He states that he has difficulty sleeping at night and states that he \"hasn't slept in 3 nights.\" He has tried Seroquel and Trazadone to help with sleep but states that they weren't effective. He has a history of smoking 1 ppd for 30 years. He denies any shortness of breath, cough, or wheezing.       Current Outpatient Medications:     amLODIPine (NORVASC) 10 MG tablet, Take 1 tablet by mouth Daily., Disp: , Rfl:     levothyroxine (SYNTHROID, LEVOTHROID) 200 MCG tablet, Take 1 tablet by mouth Daily., Disp: , Rfl:     losartan (COZAAR) 50 MG tablet, Take 1 tablet by mouth Daily., Disp: 30 tablet, Rfl: 0    omeprazole (priLOSEC) 20 MG capsule, Take 1 capsule by mouth Every Morning., Disp: , Rfl:       Allergies   Allergen Reactions    Ace Inhibitors Angioedema       Objective     Objective   Vital Signs:  /72   Pulse " "91   Temp 98.2 °F (36.8 °C)   Ht 170.2 cm (67\")   Wt 98.9 kg (218 lb)   SpO2 96%   BMI 34.14 kg/m²   Estimated body mass index is 34.14 kg/m² as calculated from the following:    Height as of this encounter: 170.2 cm (67\").    Weight as of this encounter: 98.9 kg (218 lb).          Past Medical History:   Diagnosis Date    Chronic bronchitis     Disease of thyroid gland     Hypertension     Sleep apnea      Past Surgical History:   Procedure Laterality Date    COLONOSCOPY N/A 3/6/2020    Procedure: COLONOSCOPY;  Surgeon: Juan Ramon Roper MD;  Location: [facility];  Service: Gastroenterology;  Laterality: N/A;    HERNIA REPAIR      SINUS SURGERY       Social History     Socioeconomic History    Marital status:    Tobacco Use    Smoking status: Every Day     Current packs/day: 1.00     Average packs/day: 1 pack/day for 30.0 years (30.0 ttl pk-yrs)     Types: Cigarettes     Passive exposure: Current    Smokeless tobacco: Current   Vaping Use    Vaping status: Never Used   Substance and Sexual Activity    Alcohol use: Not Currently     Comment: QUIT FEB 2020    Drug use: Never    Sexual activity: Defer        Physical Exam  Constitutional:       General: He is awake.      Appearance: Normal appearance. He is obese.   HENT:      Head: Normocephalic and atraumatic.      Nose: Nose normal.      Mouth/Throat:      Mouth: Mucous membranes are moist.      Pharynx: Oropharynx is clear.   Eyes:      Conjunctiva/sclera: Conjunctivae normal.      Pupils: Pupils are equal, round, and reactive to light.   Cardiovascular:      Rate and Rhythm: Normal rate and regular rhythm.      Pulses: Normal pulses.      Heart sounds: Normal heart sounds. No murmur heard.     No friction rub. No gallop.   Pulmonary:      Effort: Pulmonary effort is normal. No tachypnea, accessory muscle usage or respiratory distress.      Breath sounds: Normal breath sounds. No decreased breath sounds, wheezing, rhonchi or rales.   Musculoskeletal:    " "     General: Normal range of motion.      Cervical back: Full passive range of motion without pain and normal range of motion.   Skin:     General: Skin is warm and dry.   Neurological:      General: No focal deficit present.      Mental Status: He is alert. Mental status is at baseline.   Psychiatric:         Mood and Affect: Mood normal.         Behavior: Behavior normal. Behavior is cooperative.         Thought Content: Thought content normal.          Result Review :  The following labs and radiology results have been reviewed.    Lab Review:   No results found for: \"FEV1\", \"FVC\", \"YFB9BDE\", \"TLC\", \"DLCO\"  Admission on 02/25/2025, Discharged on 02/25/2025   Component Date Value Ref Range Status    Product Code 02/25/2025 G7521D69   Final    Unit Number 02/25/2025 [id number]   Final    UNIT  ABO 02/25/2025 O   Final    UNIT  RH 02/25/2025 POS   Final    Dispense Status 02/25/2025 PT   Final    Blood Expiration Date 02/25/2025 202502260107   Final    Blood Type Barcode 02/25/2025 5100   Final    Product Code 02/25/2025 Y7877L75   Final    Unit Number 02/25/2025 [id number]   Final    UNIT  ABO 02/25/2025 O   Final    UNIT  RH 02/25/2025 POS   Final    Dispense Status 02/25/2025 PT   Final    Blood Expiration Date 02/25/2025 202502260115   Final    Blood Type Barcode 02/25/2025 5100   Final    ABO Type 02/25/2025 O   Final    RH type 02/25/2025 Positive   Final    Antibody Screen 02/25/2025 Negative   Final    T&S Expiration Date 02/25/2025 2/28/2025 11:59:59 PM   Final    ABO Type 02/25/2025 O   Final    RH type 02/25/2025 Positive   Final    QT Interval 02/25/2025 316  ms Final    QTC Interval 02/25/2025 437  ms Final    Strep A Ag 02/25/2025 Negative  Negative Final    ADENOVIRUS, PCR 02/25/2025 Not Detected  Not Detected Final    Coronavirus 229E 02/25/2025 Not Detected  Not Detected Final    Coronavirus HKU1 02/25/2025 Not Detected  Not Detected Final    Coronavirus NL63 02/25/2025 Not Detected  Not " Detected Final    Coronavirus OC43 02/25/2025 Not Detected  Not Detected Final    COVID19 02/25/2025 Not Detected  Not Detected - Ref. Range Final    Human Metapneumovirus 02/25/2025 Not Detected  Not Detected Final    Human Rhinovirus/Enterovirus 02/25/2025 Not Detected  Not Detected Final    Influenza A PCR 02/25/2025 Not Detected  Not Detected Final    Influenza B PCR 02/25/2025 Not Detected  Not Detected Final    Parainfluenza Virus 1 02/25/2025 Not Detected  Not Detected Final    Parainfluenza Virus 2 02/25/2025 Not Detected  Not Detected Final    Parainfluenza Virus 3 02/25/2025 Not Detected  Not Detected Final    Parainfluenza Virus 4 02/25/2025 Not Detected  Not Detected Final    RSV, PCR 02/25/2025 Not Detected  Not Detected Final    Bordetella pertussis pcr 02/25/2025 Not Detected  Not Detected Final    Bordetella parapertussis PCR 02/25/2025 Not Detected  Not Detected Final    Chlamydophila pneumoniae PCR 02/25/2025 Not Detected  Not Detected Final    Mycoplasma pneumo by PCR 02/25/2025 Not Detected  Not Detected Final    Throat Culture, Beta Strep 02/25/2025 No Beta Hemolytic Streptococcus Isolated   Final        Imaging Results (Most Recent)       None            Radiology Review:   Imaging Results (Last 72 Hours)       ** No results found for the last 72 hours. **            Assessment / Plan         Assessment   Diagnoses and all orders for this visit:    1. Central sleep apnea (Primary)  2. Obesity (BMI 30-39.9)  Reports that he previously had a sleep study >20 years ago at [facility] and was set up on CPAP but states that it stopped working. He tried getting a new device through the [employer] but insurance wouldn't cover as there is no record of previous sleep study.   Patient bought CPAP machine out of pocket, current pressure setting 21 reportedly. Doesn't feel that it's effective and has been having frequent nighttime awakenings and excessive daytime sleepiness/fatigue.   Will refer  patient for titration study.     -     Polysomnography 4 or More Parameters With CPAP; Future    Management obstructive sleep apnea also includes lifestyle modifications including weight loss, avoidance of alcohol, sedated, caffeine especially before bedtime, allowing adequate sleep time, and body position during sleep such as side versus back. 10% weight loss can result in a 50% improvement of the apnea-hypopnea index. Untreated sleep apnea can lead to cardiovascular/metabolic disorder, neurocognitive deficit, daytime sleepiness, motor vehicle accidents, depression, mood disorders and reduced quality of life.  Patient understands the risk of untreated obstructive sleep apnea and benefit benefits of the treatment. Recommended at least 4 hours of use per night for 70% of every month (approximately 21 out of 30 days) per CMS guidelines.      3. Cigarette nicotine dependence without complication  Toy Clemons  reports that he has been smoking cigarettes. He has a 30 pack-year smoking history. He has been exposed to tobacco smoke. He uses smokeless tobacco.   Patient denies any issues with shortness of breath, wheezing, or cough. Can consider PFT in the future if patient becomes symptomatic.   Ordered annual LDCT screening.     -      CT Chest Low Dose Cancer Screening WO; Future    Medications Discontinued During This Encounter   Medication Reason    QUEtiapine (SEROquel) 25 MG tablet *Therapy completed    traZODone (DESYREL) 50 MG tablet *Therapy completed    lisinopril (PRINIVIL,ZESTRIL) 20 MG tablet *Therapy completed    naproxen (NAPROSYN) 500 MG tablet *Therapy completed        No orders of the defined types were placed in this encounter.    I spent 30 minutes caring for Toy on this date of service. This time includes time spent by me in the following activities:preparing for the visit, reviewing tests, obtaining and/or reviewing a separately obtained history, performing a medically appropriate examination  and/or evaluation , counseling and educating the patient/family/caregiver, ordering medications, tests, or procedures, referring and communicating with other health care professionals , documenting information in the medical record, independently interpreting results and communicating that information with the patient/family/caregiver, and care coordination    Follow Up   Return in about 2 months (around 6/17/2025), or if symptoms worsen or fail to improve, for Next scheduled follow up.    Patient was given instructions and counseling regarding his condition or for health maintenance advice. Please see specific information pulled into the AVS if appropriate.       This document has been electronically signed by Evelyne Abbott PA-C   April 17, 2025 12:01 EDT    Dictated Utilizing Dragon Dictation: Part of this note may be an electronic transcription/translation of spoken language to printed text using the Dragon Dictation System.

## 2025-04-24 ENCOUNTER — HOSPITAL ENCOUNTER (EMERGENCY)
Facility: HOSPITAL | Age: 55
Discharge: HOME | End: 2025-04-25
Payer: MEDICAID

## 2025-04-24 ENCOUNTER — APPOINTMENT (OUTPATIENT)
Dept: GENERAL RADIOLOGY | Facility: HOSPITAL | Age: 55
End: 2025-04-24
Payer: MEDICAID

## 2025-04-24 DIAGNOSIS — F10.930 ALCOHOL WITHDRAWAL SYNDROME WITHOUT COMPLICATION: Primary | ICD-10-CM

## 2025-04-24 LAB
ALBUMIN SERPL-MCNC: 4.3 G/DL (ref 3.5–5.2)
ALBUMIN SERPL-MCNC: 4.8 G/DL (ref 3.5–5.2)
ALBUMIN/GLOB SERPL: 1.5 G/DL
ALBUMIN/GLOB SERPL: 1.6 G/DL
ALP SERPL-CCNC: 84 U/L (ref 39–117)
ALP SERPL-CCNC: 97 U/L (ref 39–117)
ALT SERPL W P-5'-P-CCNC: 128 U/L (ref 1–41)
ALT SERPL W P-5'-P-CCNC: 150 U/L (ref 1–41)
ANION GAP SERPL CALCULATED.3IONS-SCNC: 11.4 MMOL/L (ref 5–15)
ANION GAP SERPL CALCULATED.3IONS-SCNC: 11.6 MMOL/L (ref 5–15)
AST SERPL-CCNC: 101 U/L (ref 1–40)
AST SERPL-CCNC: 129 U/L (ref 1–40)
BASOPHILS # BLD AUTO: 0.05 10*3/MM3 (ref 0–0.2)
BASOPHILS NFR BLD AUTO: 0.7 % (ref 0–1.5)
BILIRUB SERPL-MCNC: 0.6 MG/DL (ref 0–1.2)
BILIRUB SERPL-MCNC: 0.7 MG/DL (ref 0–1.2)
BUN SERPL-MCNC: 2 MG/DL (ref 6–20)
BUN SERPL-MCNC: 2 MG/DL (ref 6–20)
BUN/CREAT SERPL: 2.5 (ref 7–25)
BUN/CREAT SERPL: 2.9 (ref 7–25)
CALCIUM SPEC-SCNC: 8.8 MG/DL (ref 8.6–10.5)
CALCIUM SPEC-SCNC: 9.5 MG/DL (ref 8.6–10.5)
CHLORIDE SERPL-SCNC: 87 MMOL/L (ref 98–107)
CHLORIDE SERPL-SCNC: 91 MMOL/L (ref 98–107)
CO2 SERPL-SCNC: 24.4 MMOL/L (ref 22–29)
CO2 SERPL-SCNC: 24.6 MMOL/L (ref 22–29)
CREAT SERPL-MCNC: 0.7 MG/DL (ref 0.76–1.27)
CREAT SERPL-MCNC: 0.8 MG/DL (ref 0.76–1.27)
DEPRECATED RDW RBC AUTO: 45.3 FL (ref 37–54)
EGFRCR SERPLBLD CKD-EPI 2021: 105.8 ML/MIN/1.73
EGFRCR SERPLBLD CKD-EPI 2021: 110.2 ML/MIN/1.73
EOSINOPHIL # BLD AUTO: 0.02 10*3/MM3 (ref 0–0.4)
EOSINOPHIL NFR BLD AUTO: 0.3 % (ref 0.3–6.2)
ERYTHROCYTE [DISTWIDTH] IN BLOOD BY AUTOMATED COUNT: 14.3 % (ref 12.3–15.4)
ETHANOL BLD-MCNC: 51 MG/DL (ref 0–10)
ETHANOL UR QL: 0.05 %
GEN 5 1HR TROPONIN T REFLEX: 8 NG/L
GLOBULIN UR ELPH-MCNC: 2.7 GM/DL
GLOBULIN UR ELPH-MCNC: 3.1 GM/DL
GLUCOSE SERPL-MCNC: 106 MG/DL (ref 65–99)
GLUCOSE SERPL-MCNC: 114 MG/DL (ref 65–99)
HCT VFR BLD AUTO: 47.1 % (ref 37.5–51)
HGB BLD-MCNC: 16.3 G/DL (ref 13–17.7)
HOLD SPECIMEN: NORMAL
HOLD SPECIMEN: NORMAL
IMM GRANULOCYTES # BLD AUTO: 0.03 10*3/MM3 (ref 0–0.05)
IMM GRANULOCYTES NFR BLD AUTO: 0.4 % (ref 0–0.5)
LYMPHOCYTES # BLD AUTO: 1.35 10*3/MM3 (ref 0.7–3.1)
LYMPHOCYTES NFR BLD AUTO: 19.5 % (ref 19.6–45.3)
MAGNESIUM SERPL-MCNC: 1.8 MG/DL (ref 1.6–2.6)
MCH RBC QN AUTO: 30 PG (ref 26.6–33)
MCHC RBC AUTO-ENTMCNC: 34.6 G/DL (ref 31.5–35.7)
MCV RBC AUTO: 86.6 FL (ref 79–97)
MONOCYTES # BLD AUTO: 1.12 10*3/MM3 (ref 0.1–0.9)
MONOCYTES NFR BLD AUTO: 16.2 % (ref 5–12)
NEUTROPHILS NFR BLD AUTO: 4.35 10*3/MM3 (ref 1.7–7)
NEUTROPHILS NFR BLD AUTO: 62.9 % (ref 42.7–76)
NRBC BLD AUTO-RTO: 0 /100 WBC (ref 0–0.2)
PLATELET # BLD AUTO: 333 10*3/MM3 (ref 140–450)
PMV BLD AUTO: 8.4 FL (ref 6–12)
POTASSIUM SERPL-SCNC: 4 MMOL/L (ref 3.5–5.2)
POTASSIUM SERPL-SCNC: 4.3 MMOL/L (ref 3.5–5.2)
PROT SERPL-MCNC: 7 G/DL (ref 6–8.5)
PROT SERPL-MCNC: 7.9 G/DL (ref 6–8.5)
QT INTERVAL: 324 MS
QTC INTERVAL: 430 MS
RBC # BLD AUTO: 5.44 10*6/MM3 (ref 4.14–5.8)
SODIUM SERPL-SCNC: 123 MMOL/L (ref 136–145)
SODIUM SERPL-SCNC: 127 MMOL/L (ref 136–145)
SODIUM SERPL-SCNC: 129 MMOL/L (ref 136–145)
TROPONIN T NUMERIC DELTA: -1 NG/L
TROPONIN T SERPL HS-MCNC: 9 NG/L
WBC NRBC COR # BLD AUTO: 6.92 10*3/MM3 (ref 3.4–10.8)
WHOLE BLOOD HOLD COAG: NORMAL
WHOLE BLOOD HOLD SPECIMEN: NORMAL

## 2025-04-24 PROCEDURE — 85025 COMPLETE CBC W/AUTO DIFF WBC: CPT | Performed by: EMERGENCY MEDICINE

## 2025-04-24 PROCEDURE — 80053 COMPREHEN METABOLIC PANEL: CPT

## 2025-04-24 PROCEDURE — 84295 ASSAY OF SERUM SODIUM: CPT

## 2025-04-24 PROCEDURE — 96374 THER/PROPH/DIAG INJ IV PUSH: CPT

## 2025-04-24 PROCEDURE — 99284 EMERGENCY DEPT VISIT MOD MDM: CPT

## 2025-04-24 PROCEDURE — 96375 TX/PRO/DX INJ NEW DRUG ADDON: CPT

## 2025-04-24 PROCEDURE — 96376 TX/PRO/DX INJ SAME DRUG ADON: CPT

## 2025-04-24 PROCEDURE — 83735 ASSAY OF MAGNESIUM: CPT

## 2025-04-24 PROCEDURE — 25010000002 LORAZEPAM PER 2 MG

## 2025-04-24 PROCEDURE — 71045 X-RAY EXAM CHEST 1 VIEW: CPT | Performed by: RADIOLOGY

## 2025-04-24 PROCEDURE — 71045 X-RAY EXAM CHEST 1 VIEW: CPT

## 2025-04-24 PROCEDURE — 93005 ELECTROCARDIOGRAM TRACING: CPT | Performed by: EMERGENCY MEDICINE

## 2025-04-24 PROCEDURE — 82077 ASSAY SPEC XCP UR&BREATH IA: CPT

## 2025-04-24 PROCEDURE — 25810000003 SODIUM CHLORIDE 0.9 % SOLUTION

## 2025-04-24 PROCEDURE — 80053 COMPREHEN METABOLIC PANEL: CPT | Performed by: EMERGENCY MEDICINE

## 2025-04-24 PROCEDURE — 25010000002 ONDANSETRON PER 1 MG

## 2025-04-24 PROCEDURE — 36415 COLL VENOUS BLD VENIPUNCTURE: CPT

## 2025-04-24 PROCEDURE — 84484 ASSAY OF TROPONIN QUANT: CPT | Performed by: EMERGENCY MEDICINE

## 2025-04-24 RX ORDER — LORAZEPAM 2 MG/ML
1 INJECTION INTRAMUSCULAR ONCE
Status: COMPLETED | OUTPATIENT
Start: 2025-04-24 | End: 2025-04-24

## 2025-04-24 RX ORDER — ONDANSETRON 2 MG/ML
4 INJECTION INTRAMUSCULAR; INTRAVENOUS ONCE
Status: COMPLETED | OUTPATIENT
Start: 2025-04-24 | End: 2025-04-24

## 2025-04-24 RX ORDER — SODIUM CHLORIDE 0.9 % (FLUSH) 0.9 %
10 SYRINGE (ML) INJECTION AS NEEDED
Status: DISCONTINUED | OUTPATIENT
Start: 2025-04-24 | End: 2025-04-25 | Stop reason: HOSPADM

## 2025-04-24 RX ORDER — ASPIRIN 81 MG/1
324 TABLET, CHEWABLE ORAL ONCE
Status: DISCONTINUED | OUTPATIENT
Start: 2025-04-24 | End: 2025-04-24

## 2025-04-24 RX ORDER — LORAZEPAM 2 MG/ML
2 INJECTION INTRAMUSCULAR ONCE
Status: COMPLETED | OUTPATIENT
Start: 2025-04-24 | End: 2025-04-24

## 2025-04-24 RX ADMIN — ONDANSETRON 4 MG: 2 INJECTION INTRAMUSCULAR; INTRAVENOUS at 14:33

## 2025-04-24 RX ADMIN — LORAZEPAM 1 MG: 2 INJECTION INTRAMUSCULAR; INTRAVENOUS at 18:12

## 2025-04-24 RX ADMIN — LORAZEPAM 2 MG: 2 INJECTION INTRAMUSCULAR; INTRAVENOUS at 14:34

## 2025-04-24 RX ADMIN — SODIUM CHLORIDE 1000 ML: 9 INJECTION, SOLUTION INTRAVENOUS at 15:33

## 2025-04-24 RX ADMIN — SODIUM CHLORIDE 1000 ML: 9 INJECTION, SOLUTION INTRAVENOUS at 20:21

## 2025-04-24 NOTE — ED NOTES
Was notified by provider to wait to draw CMP once fluids have been finished. Checked on fluids at this time and are still not finished. Will retrieve labs once fluids have been complete.

## 2025-04-25 VITALS
HEIGHT: 69 IN | OXYGEN SATURATION: 96 % | RESPIRATION RATE: 16 BRPM | TEMPERATURE: 98.2 F | BODY MASS INDEX: 29.62 KG/M2 | WEIGHT: 200 LBS | HEART RATE: 111 BPM | DIASTOLIC BLOOD PRESSURE: 82 MMHG | SYSTOLIC BLOOD PRESSURE: 156 MMHG

## 2025-04-25 NOTE — NURSING NOTE
Discharge paperwork explained to pt. Pt declined outpt resources. Pt and all belongings exiting Intake at this time.

## 2025-04-25 NOTE — NURSING NOTE
Spoke with Dr. Plasencia who states pt will be brought to Intake as pt requesting Detox from alcohol.

## 2025-04-25 NOTE — NURSING NOTE
Pt states he does not want to be assessed for detox. Pt denying any SI/HI/AVH. Pt requesting to be discharged at this time. Dr. Mota contacted and is agreeable to pt being discharged at this time.

## 2025-04-25 NOTE — ED PROVIDER NOTES
Subjective   History of Present Illness  55-year-old male with history of alcohol abuse presents to the ED with withdrawals.  Patient states has been drinking roughly a 4 pack of beer every day for the last 2 weeks.  He has an extensive history of withdrawals and abuse.  He states he is looking to going to detox.      Review of Systems   Constitutional: Negative.  Negative for fever.   HENT: Negative.     Respiratory: Negative.     Cardiovascular: Negative.  Negative for chest pain.   Gastrointestinal: Negative.  Negative for abdominal pain.   Endocrine: Negative.    Genitourinary: Negative.  Negative for dysuria.   Skin: Negative.    Neurological: Negative.    Psychiatric/Behavioral:  Positive for behavioral problems.    All other systems reviewed and are negative.      Past Medical History:   Diagnosis Date    Chronic bronchitis     Disease of thyroid gland     Hypertension     Sleep apnea        Allergies   Allergen Reactions    Ace Inhibitors Angioedema       Past Surgical History:   Procedure Laterality Date    COLONOSCOPY N/A 3/6/2020    Procedure: COLONOSCOPY;  Surgeon: Juan Ramon Roper MD;  [facility];  Service: Gastroenterology;  Laterality: N/A;    HERNIA REPAIR      SINUS SURGERY         No family history on file.    Social History     Socioeconomic History    Marital status:    Tobacco Use    Smoking status: Every Day     Current packs/day: 1.00     Average packs/day: 1 pack/day for 30.0 years (30.0 ttl pk-yrs)     Types: Cigarettes     Passive exposure: Current    Smokeless tobacco: Current   Vaping Use    Vaping status: Never Used   Substance and Sexual Activity    Alcohol use: Not Currently     Comment: QUIT FEB 2020    Drug use: Never    Sexual activity: Defer           Objective   Physical Exam  Vitals and nursing note reviewed.   Constitutional:       General: He is in acute distress.      Appearance: He is well-developed. He is ill-appearing. He is not diaphoretic.   HENT:       Head: Normocephalic and atraumatic.      Right Ear: External ear normal.      Left Ear: External ear normal.      Nose: Nose normal.   Eyes:      Conjunctiva/sclera: Conjunctivae normal.      Pupils: Pupils are equal, round, and reactive to light.   Neck:      Vascular: No JVD.      Trachea: No tracheal deviation.   Cardiovascular:      Rate and Rhythm: Normal rate and regular rhythm.      Heart sounds: Normal heart sounds. No murmur heard.  Pulmonary:      Effort: Pulmonary effort is normal. No respiratory distress.      Breath sounds: Normal breath sounds. No wheezing.   Abdominal:      General: Bowel sounds are normal.      Palpations: Abdomen is soft.      Tenderness: There is no abdominal tenderness.   Musculoskeletal:         General: No deformity. Normal range of motion.      Cervical back: Normal range of motion and neck supple.   Skin:     General: Skin is warm and dry.      Coloration: Skin is not pale.      Findings: No erythema or rash.   Neurological:      Mental Status: He is alert and oriented to person, place, and time.      Cranial Nerves: No cranial nerve deficit.   Psychiatric:         Behavior: Behavior normal.         Thought Content: Thought content normal.         Procedures           ED Course  ED Course as of 04/24/25 2356   Thu Apr 24, 2025   1352 EKG at 1339 shows sinus tachycardia, rate 106.  UT interval 130, QRS duration 82, QTc 430 ms.  Nonspecific ST changes.  No evidence for STEMI.  Electronically signed by Judah Swenson MD, 04/24/25, 1:53 PM EDT.   [CM]      ED Course User Index  [CM] Judah Swenson MD                                                       Medical Decision Making  Patient refused detox after being transferred to the behavioral health unit.  Recommended patient return to the ER if his symptoms acutely worsen.  We discussed the dangers of alcohol withdrawal.    Problems Addressed:  Alcohol withdrawal syndrome without complication: complicated acute illness  or injury    Risk  Prescription drug management.        Final diagnoses:   Alcohol withdrawal syndrome without complication       ED Disposition  ED Disposition       ED Disposition   Discharge    Condition   Stable    Comment   --               Sloan Yo  [address] [place], [place] [address]y [address]  [address]  [facility] [address]  [phone]    Schedule an appointment as soon as possible for a visit in 1 week      [facility]  [address] [place] [id number] [phone]  Go to   If symptoms worsen    Mainor Coyle MD  [address] [place], [place] [id number]  [phone]    Schedule an appointment as soon as possible for a visit in 1 day           Medication List      No changes were made to your prescriptions during this visit.            Max Plasencia, DO  04/24/25 3917

## 2025-05-01 ENCOUNTER — APPOINTMENT (OUTPATIENT)
Dept: GENERAL RADIOLOGY | Facility: HOSPITAL | Age: 55
End: 2025-05-01
Payer: MEDICAID

## 2025-05-01 ENCOUNTER — APPOINTMENT (OUTPATIENT)
Dept: CT IMAGING | Facility: HOSPITAL | Age: 55
End: 2025-05-01
Payer: MEDICAID

## 2025-05-01 ENCOUNTER — APPOINTMENT (OUTPATIENT)
Dept: CARDIOLOGY | Facility: HOSPITAL | Age: 55
End: 2025-05-01
Payer: MEDICAID

## 2025-05-01 ENCOUNTER — HOSPITAL ENCOUNTER (INPATIENT)
Facility: HOSPITAL | Age: 55
LOS: 2 days | Discharge: HOME | End: 2025-05-03
Attending: FAMILY MEDICINE
Payer: MEDICAID

## 2025-05-01 ENCOUNTER — APPOINTMENT (OUTPATIENT)
Dept: RESPIRATORY THERAPY | Facility: HOSPITAL | Age: 55
End: 2025-05-01
Payer: MEDICAID

## 2025-05-01 ENCOUNTER — APPOINTMENT (OUTPATIENT)
Dept: MRI IMAGING | Facility: HOSPITAL | Age: 55
End: 2025-05-01
Payer: MEDICAID

## 2025-05-01 DIAGNOSIS — R20.0 NUMBNESS AND TINGLING IN BOTH HANDS: ICD-10-CM

## 2025-05-01 DIAGNOSIS — R20.2 NUMBNESS AND TINGLING IN BOTH HANDS: ICD-10-CM

## 2025-05-01 DIAGNOSIS — R51.9 ACUTE INTRACTABLE HEADACHE, UNSPECIFIED HEADACHE TYPE: ICD-10-CM

## 2025-05-01 DIAGNOSIS — R93.89 ABNORMAL CAT SCAN: Primary | ICD-10-CM

## 2025-05-01 DIAGNOSIS — G47.00 INSOMNIA, UNSPECIFIED TYPE: ICD-10-CM

## 2025-05-01 PROBLEM — E87.1 HYPONATREMIA: Status: ACTIVE | Noted: 2025-05-01

## 2025-05-01 LAB
ABO GROUP BLD: NORMAL
ALBUMIN SERPL-MCNC: 4.6 G/DL (ref 3.5–5.2)
ALBUMIN/GLOB SERPL: 1.6 G/DL
ALP SERPL-CCNC: 79 U/L (ref 39–117)
ALT SERPL W P-5'-P-CCNC: 246 U/L (ref 1–41)
AMPHET+METHAMPHET UR QL: NEGATIVE
AMPHETAMINES UR QL: NEGATIVE
ANION GAP SERPL CALCULATED.3IONS-SCNC: 11.7 MMOL/L (ref 5–15)
ANION GAP SERPL CALCULATED.3IONS-SCNC: 11.8 MMOL/L (ref 5–15)
ANION GAP SERPL CALCULATED.3IONS-SCNC: 12.4 MMOL/L (ref 5–15)
AORTIC DIMENSIONLESS INDEX: 1.06 (DI)
APTT PPP: 29.2 SECONDS (ref 24.5–35.9)
AST SERPL-CCNC: 92 U/L (ref 1–40)
AV MEAN PRESS GRAD SYS DOP V1V2: 3 MMHG
AV VMAX SYS DOP: 121 CM/SEC
BARBITURATES UR QL SCN: NEGATIVE
BASOPHILS # BLD AUTO: 0.02 10*3/MM3 (ref 0–0.2)
BASOPHILS NFR BLD AUTO: 0.3 % (ref 0–1.5)
BENZODIAZ UR QL SCN: POSITIVE
BH CV ECHO MEAS - ACS: 2.3 CM
BH CV ECHO MEAS - AO MAX PG: 5.9 MMHG
BH CV ECHO MEAS - AO ROOT DIAM: 3.8 CM
BH CV ECHO MEAS - AO V2 VTI: 24.7 CM
BH CV ECHO MEAS - AVA(I,D): 4 CM2
BH CV ECHO MEAS - EDV(CUBED): 103.8 ML
BH CV ECHO MEAS - EDV(MOD-SP2): 76.5 ML
BH CV ECHO MEAS - EDV(MOD-SP4): 72.1 ML
BH CV ECHO MEAS - EF(MOD-SP2): 57.4 %
BH CV ECHO MEAS - EF(MOD-SP4): 60.6 %
BH CV ECHO MEAS - ESV(CUBED): 15.6 ML
BH CV ECHO MEAS - ESV(MOD-SP2): 32.6 ML
BH CV ECHO MEAS - ESV(MOD-SP4): 28.4 ML
BH CV ECHO MEAS - FS: 46.8 %
BH CV ECHO MEAS - IVS/LVPW: 1.11 CM
BH CV ECHO MEAS - IVSD: 1 CM
BH CV ECHO MEAS - LA DIMENSION: 3.8 CM
BH CV ECHO MEAS - LAT PEAK E' VEL: 9.8 CM/SEC
BH CV ECHO MEAS - LV DIASTOLIC VOL/BSA (35-75): 34.9 CM2
BH CV ECHO MEAS - LV MASS(C)D: 153.4 GRAMS
BH CV ECHO MEAS - LV MAX PG: 6.3 MMHG
BH CV ECHO MEAS - LV MEAN PG: 3 MMHG
BH CV ECHO MEAS - LV SYSTOLIC VOL/BSA (12-30): 13.8 CM2
BH CV ECHO MEAS - LV V1 MAX: 125 CM/SEC
BH CV ECHO MEAS - LV V1 VTI: 26.2 CM
BH CV ECHO MEAS - LVIDD: 4.7 CM
BH CV ECHO MEAS - LVIDS: 2.5 CM
BH CV ECHO MEAS - LVOT AREA: 3.8 CM2
BH CV ECHO MEAS - LVOT DIAM: 2.2 CM
BH CV ECHO MEAS - LVPWD: 0.9 CM
BH CV ECHO MEAS - MED PEAK E' VEL: 10.4 CM/SEC
BH CV ECHO MEAS - MV A MAX VEL: 80.1 CM/SEC
BH CV ECHO MEAS - MV E MAX VEL: 84.8 CM/SEC
BH CV ECHO MEAS - MV E/A: 1.06
BH CV ECHO MEAS - PA ACC TIME: 0.13 SEC
BH CV ECHO MEAS - RVDD: 2.8 CM
BH CV ECHO MEAS - SV(LVOT): 99.6 ML
BH CV ECHO MEAS - SV(MOD-SP2): 43.9 ML
BH CV ECHO MEAS - SV(MOD-SP4): 43.7 ML
BH CV ECHO MEAS - SVI(LVOT): 48.2 ML/M2
BH CV ECHO MEAS - SVI(MOD-SP2): 21.3 ML/M2
BH CV ECHO MEAS - SVI(MOD-SP4): 21.2 ML/M2
BH CV ECHO MEAS - TAPSE (>1.6): 2.5 CM
BH CV ECHO MEASUREMENTS AVERAGE E/E' RATIO: 8.4
BH CV XLRA - RV BASE: 3.1 CM
BH CV XLRA - RV LENGTH: 5.1 CM
BH CV XLRA - RV MID: 2.8 CM
BILIRUB SERPL-MCNC: 1 MG/DL (ref 0–1.2)
BLD GP AB SCN SERPL QL: NEGATIVE
BUN SERPL-MCNC: 6 MG/DL (ref 6–20)
BUN SERPL-MCNC: 7 MG/DL (ref 6–20)
BUN SERPL-MCNC: 8 MG/DL (ref 6–20)
BUN/CREAT SERPL: 10.1 (ref 7–25)
BUN/CREAT SERPL: 8 (ref 7–25)
BUN/CREAT SERPL: 8.5 (ref 7–25)
BUPRENORPHINE SERPL-MCNC: NEGATIVE NG/ML
CALCIUM SPEC-SCNC: 9.2 MG/DL (ref 8.6–10.5)
CALCIUM SPEC-SCNC: 9.2 MG/DL (ref 8.6–10.5)
CALCIUM SPEC-SCNC: 9.3 MG/DL (ref 8.6–10.5)
CANNABINOIDS SERPL QL: NEGATIVE
CHLORIDE SERPL-SCNC: 86 MMOL/L (ref 98–107)
CHLORIDE SERPL-SCNC: 91 MMOL/L (ref 98–107)
CHLORIDE SERPL-SCNC: 93 MMOL/L (ref 98–107)
CO2 SERPL-SCNC: 21.6 MMOL/L (ref 22–29)
CO2 SERPL-SCNC: 22.2 MMOL/L (ref 22–29)
CO2 SERPL-SCNC: 23.3 MMOL/L (ref 22–29)
COCAINE UR QL: NEGATIVE
CREAT SERPL-MCNC: 0.69 MG/DL (ref 0.76–1.27)
CREAT SERPL-MCNC: 0.75 MG/DL (ref 0.76–1.27)
CREAT SERPL-MCNC: 0.94 MG/DL (ref 0.76–1.27)
DEPRECATED RDW RBC AUTO: 43.8 FL (ref 37–54)
EGFRCR SERPLBLD CKD-EPI 2021: 107.9 ML/MIN/1.73
EGFRCR SERPLBLD CKD-EPI 2021: 110.7 ML/MIN/1.73
EGFRCR SERPLBLD CKD-EPI 2021: 96.9 ML/MIN/1.73
EOSINOPHIL # BLD AUTO: 0.08 10*3/MM3 (ref 0–0.4)
EOSINOPHIL NFR BLD AUTO: 1.1 % (ref 0.3–6.2)
ERYTHROCYTE [DISTWIDTH] IN BLOOD BY AUTOMATED COUNT: 13.8 % (ref 12.3–15.4)
ETHANOL BLD-MCNC: <10 MG/DL (ref 0–10)
ETHANOL UR QL: <0.01 %
FENTANYL UR-MCNC: NEGATIVE NG/ML
GLOBULIN UR ELPH-MCNC: 2.9 GM/DL
GLUCOSE BLDC GLUCOMTR-MCNC: 107 MG/DL (ref 70–130)
GLUCOSE SERPL-MCNC: 100 MG/DL (ref 65–99)
GLUCOSE SERPL-MCNC: 109 MG/DL (ref 65–99)
GLUCOSE SERPL-MCNC: 131 MG/DL (ref 65–99)
HCT VFR BLD AUTO: 43.6 % (ref 37.5–51)
HGB BLD-MCNC: 15.2 G/DL (ref 13–17.7)
HOLD SPECIMEN: NORMAL
HOLD SPECIMEN: NORMAL
IMM GRANULOCYTES # BLD AUTO: 0.03 10*3/MM3 (ref 0–0.05)
IMM GRANULOCYTES NFR BLD AUTO: 0.4 % (ref 0–0.5)
INR PPP: 0.99 (ref 0.9–1.1)
LEFT ATRIUM VOLUME INDEX: 33.1 ML/M2
LV EF BIPLANE MOD: 58.3 %
LYMPHOCYTES # BLD AUTO: 1.8 10*3/MM3 (ref 0.7–3.1)
LYMPHOCYTES NFR BLD AUTO: 24.1 % (ref 19.6–45.3)
MCH RBC QN AUTO: 29.8 PG (ref 26.6–33)
MCHC RBC AUTO-ENTMCNC: 34.9 G/DL (ref 31.5–35.7)
MCV RBC AUTO: 85.5 FL (ref 79–97)
METHADONE UR QL SCN: NEGATIVE
MONOCYTES # BLD AUTO: 1.15 10*3/MM3 (ref 0.1–0.9)
MONOCYTES NFR BLD AUTO: 15.4 % (ref 5–12)
NEUTROPHILS NFR BLD AUTO: 4.4 10*3/MM3 (ref 1.7–7)
NEUTROPHILS NFR BLD AUTO: 58.7 % (ref 42.7–76)
NRBC BLD AUTO-RTO: 0 /100 WBC (ref 0–0.2)
OPIATES UR QL: POSITIVE
OXYCODONE UR QL SCN: NEGATIVE
PCP UR QL SCN: NEGATIVE
PLATELET # BLD AUTO: 214 10*3/MM3 (ref 140–450)
PMV BLD AUTO: 8.7 FL (ref 6–12)
POTASSIUM SERPL-SCNC: 3.4 MMOL/L (ref 3.5–5.2)
POTASSIUM SERPL-SCNC: 3.7 MMOL/L (ref 3.5–5.2)
POTASSIUM SERPL-SCNC: 4.2 MMOL/L (ref 3.5–5.2)
PROT SERPL-MCNC: 7.5 G/DL (ref 6–8.5)
PROTHROMBIN TIME: 13.2 SECONDS (ref 11.6–15.1)
QT INTERVAL: 396 MS
QTC INTERVAL: 442 MS
RBC # BLD AUTO: 5.1 10*6/MM3 (ref 4.14–5.8)
RH BLD: POSITIVE
SODIUM SERPL-SCNC: 121 MMOL/L (ref 136–145)
SODIUM SERPL-SCNC: 125 MMOL/L (ref 136–145)
SODIUM SERPL-SCNC: 127 MMOL/L (ref 136–145)
SODIUM UR-SCNC: <20 MMOL/L
T&S EXPIRATION DATE: NORMAL
TRICYCLICS UR QL SCN: NEGATIVE
WBC NRBC COR # BLD AUTO: 7.48 10*3/MM3 (ref 3.4–10.8)
WHOLE BLOOD HOLD COAG: NORMAL
WHOLE BLOOD HOLD SPECIMEN: NORMAL

## 2025-05-01 PROCEDURE — A9579 GAD-BASE MR CONTRAST NOS,1ML: HCPCS | Performed by: STUDENT IN AN ORGANIZED HEALTH CARE EDUCATION/TRAINING PROGRAM

## 2025-05-01 PROCEDURE — 99222 1ST HOSP IP/OBS MODERATE 55: CPT | Performed by: PSYCHIATRY & NEUROLOGY

## 2025-05-01 PROCEDURE — 82077 ASSAY SPEC XCP UR&BREATH IA: CPT | Performed by: STUDENT IN AN ORGANIZED HEALTH CARE EDUCATION/TRAINING PROGRAM

## 2025-05-01 PROCEDURE — 82948 REAGENT STRIP/BLOOD GLUCOSE: CPT

## 2025-05-01 PROCEDURE — 0042T HC CT CEREBRAL PERFUSION W/WO CONTRAST: CPT

## 2025-05-01 PROCEDURE — 86900 BLOOD TYPING SEROLOGIC ABO: CPT | Performed by: FAMILY MEDICINE

## 2025-05-01 PROCEDURE — 25810000003 SODIUM CHLORIDE 0.9 % SOLUTION: Performed by: FAMILY MEDICINE

## 2025-05-01 PROCEDURE — 82570 ASSAY OF URINE CREATININE: CPT | Performed by: INTERNAL MEDICINE

## 2025-05-01 PROCEDURE — 99223 1ST HOSP IP/OBS HIGH 75: CPT

## 2025-05-01 PROCEDURE — 95819 EEG AWAKE AND ASLEEP: CPT | Performed by: PSYCHIATRY & NEUROLOGY

## 2025-05-01 PROCEDURE — 84300 ASSAY OF URINE SODIUM: CPT | Performed by: INTERNAL MEDICINE

## 2025-05-01 PROCEDURE — 93306 TTE W/DOPPLER COMPLETE: CPT | Performed by: INTERNAL MEDICINE

## 2025-05-01 PROCEDURE — 70498 CT ANGIOGRAPHY NECK: CPT | Performed by: RADIOLOGY

## 2025-05-01 PROCEDURE — 70553 MRI BRAIN STEM W/O & W/DYE: CPT

## 2025-05-01 PROCEDURE — 86850 RBC ANTIBODY SCREEN: CPT | Performed by: FAMILY MEDICINE

## 2025-05-01 PROCEDURE — 86901 BLOOD TYPING SEROLOGIC RH(D): CPT | Performed by: FAMILY MEDICINE

## 2025-05-01 PROCEDURE — 85610 PROTHROMBIN TIME: CPT | Performed by: FAMILY MEDICINE

## 2025-05-01 PROCEDURE — 25010000002 KETOROLAC TROMETHAMINE PER 15 MG: Performed by: STUDENT IN AN ORGANIZED HEALTH CARE EDUCATION/TRAINING PROGRAM

## 2025-05-01 PROCEDURE — 70553 MRI BRAIN STEM W/O & W/DYE: CPT | Performed by: RADIOLOGY

## 2025-05-01 PROCEDURE — 85730 THROMBOPLASTIN TIME PARTIAL: CPT | Performed by: FAMILY MEDICINE

## 2025-05-01 PROCEDURE — 80053 COMPREHEN METABOLIC PANEL: CPT | Performed by: FAMILY MEDICINE

## 2025-05-01 PROCEDURE — 93005 ELECTROCARDIOGRAM TRACING: CPT | Performed by: FAMILY MEDICINE

## 2025-05-01 PROCEDURE — 71045 X-RAY EXAM CHEST 1 VIEW: CPT | Performed by: RADIOLOGY

## 2025-05-01 PROCEDURE — 83930 ASSAY OF BLOOD OSMOLALITY: CPT | Performed by: INTERNAL MEDICINE

## 2025-05-01 PROCEDURE — 25510000001 IOPAMIDOL PER 1 ML: Performed by: FAMILY MEDICINE

## 2025-05-01 PROCEDURE — 4A03X5D MEASUREMENT OF ARTERIAL FLOW, INTRACRANIAL, EXTERNAL APPROACH: ICD-10-PCS | Performed by: RADIOLOGY

## 2025-05-01 PROCEDURE — 70496 CT ANGIOGRAPHY HEAD: CPT

## 2025-05-01 PROCEDURE — 36415 COLL VENOUS BLD VENIPUNCTURE: CPT | Performed by: FAMILY MEDICINE

## 2025-05-01 PROCEDURE — 70450 CT HEAD/BRAIN W/O DYE: CPT

## 2025-05-01 PROCEDURE — 93010 ELECTROCARDIOGRAM REPORT: CPT | Performed by: INTERNAL MEDICINE

## 2025-05-01 PROCEDURE — 25010000002 MORPHINE PER 10 MG: Performed by: FAMILY MEDICINE

## 2025-05-01 PROCEDURE — 25010000002 MAGNESIUM SULFATE 2 GM/50ML SOLUTION: Performed by: STUDENT IN AN ORGANIZED HEALTH CARE EDUCATION/TRAINING PROGRAM

## 2025-05-01 PROCEDURE — 93306 TTE W/DOPPLER COMPLETE: CPT

## 2025-05-01 PROCEDURE — 70496 CT ANGIOGRAPHY HEAD: CPT | Performed by: RADIOLOGY

## 2025-05-01 PROCEDURE — 25010000002 DIPHENHYDRAMINE PER 50 MG: Performed by: STUDENT IN AN ORGANIZED HEALTH CARE EDUCATION/TRAINING PROGRAM

## 2025-05-01 PROCEDURE — 25010000002 ENOXAPARIN PER 10 MG: Performed by: INTERNAL MEDICINE

## 2025-05-01 PROCEDURE — 25010000002 SULFUR HEXAFLUORIDE MICROSPH 60.7-25 MG RECONSTITUTED SUSPENSION: Performed by: INTERNAL MEDICINE

## 2025-05-01 PROCEDURE — 25010000002 KETOROLAC TROMETHAMINE PER 15 MG: Performed by: PSYCHIATRY & NEUROLOGY

## 2025-05-01 PROCEDURE — 83935 ASSAY OF URINE OSMOLALITY: CPT | Performed by: INTERNAL MEDICINE

## 2025-05-01 PROCEDURE — 99285 EMERGENCY DEPT VISIT HI MDM: CPT

## 2025-05-01 PROCEDURE — 70450 CT HEAD/BRAIN W/O DYE: CPT | Performed by: RADIOLOGY

## 2025-05-01 PROCEDURE — 0042T CT CEREBRAL PERFUSION W WO CONTRAST: CPT | Performed by: RADIOLOGY

## 2025-05-01 PROCEDURE — 70498 CT ANGIOGRAPHY NECK: CPT

## 2025-05-01 PROCEDURE — 25510000001 GADOPICLENOL 0.5 MMOL/ML SOLUTION: Performed by: STUDENT IN AN ORGANIZED HEALTH CARE EDUCATION/TRAINING PROGRAM

## 2025-05-01 PROCEDURE — 71045 X-RAY EXAM CHEST 1 VIEW: CPT

## 2025-05-01 PROCEDURE — 85025 COMPLETE CBC W/AUTO DIFF WBC: CPT | Performed by: FAMILY MEDICINE

## 2025-05-01 PROCEDURE — 25010000002 ONDANSETRON PER 1 MG: Performed by: PSYCHIATRY & NEUROLOGY

## 2025-05-01 PROCEDURE — 25010000002 ONDANSETRON PER 1 MG: Performed by: FAMILY MEDICINE

## 2025-05-01 PROCEDURE — 95819 EEG AWAKE AND ASLEEP: CPT

## 2025-05-01 PROCEDURE — 80307 DRUG TEST PRSMV CHEM ANLYZR: CPT

## 2025-05-01 RX ORDER — AMLODIPINE BESYLATE 10 MG/1
10 TABLET ORAL
Status: DISCONTINUED | OUTPATIENT
Start: 2025-05-01 | End: 2025-05-03 | Stop reason: HOSPADM

## 2025-05-01 RX ORDER — SODIUM CHLORIDE 9 MG/ML
1000 INJECTION, SOLUTION INTRAVENOUS ONCE
Status: COMPLETED | OUTPATIENT
Start: 2025-05-01 | End: 2025-05-01

## 2025-05-01 RX ORDER — SODIUM CHLORIDE 0.9 % (FLUSH) 0.9 %
10 SYRINGE (ML) INJECTION EVERY 12 HOURS SCHEDULED
Status: DISCONTINUED | OUTPATIENT
Start: 2025-05-01 | End: 2025-05-03 | Stop reason: HOSPADM

## 2025-05-01 RX ORDER — ZOLPIDEM TARTRATE 5 MG/1
5 TABLET ORAL NIGHTLY PRN
Status: DISCONTINUED | OUTPATIENT
Start: 2025-05-01 | End: 2025-05-03 | Stop reason: HOSPADM

## 2025-05-01 RX ORDER — ONDANSETRON 2 MG/ML
4 INJECTION INTRAMUSCULAR; INTRAVENOUS EVERY 6 HOURS PRN
Status: DISCONTINUED | OUTPATIENT
Start: 2025-05-01 | End: 2025-05-03 | Stop reason: HOSPADM

## 2025-05-01 RX ORDER — HYDROCHLOROTHIAZIDE 25 MG/1
12.5 TABLET ORAL DAILY
Status: DISCONTINUED | OUTPATIENT
Start: 2025-05-01 | End: 2025-05-03

## 2025-05-01 RX ORDER — LOSARTAN POTASSIUM 50 MG/1
100 TABLET ORAL
Status: DISCONTINUED | OUTPATIENT
Start: 2025-05-01 | End: 2025-05-03 | Stop reason: HOSPADM

## 2025-05-01 RX ORDER — SUMATRIPTAN 50 MG/1
100 TABLET, FILM COATED ORAL
Status: DISCONTINUED | OUTPATIENT
Start: 2025-05-01 | End: 2025-05-03 | Stop reason: HOSPADM

## 2025-05-01 RX ORDER — BISACODYL 10 MG
10 SUPPOSITORY, RECTAL RECTAL DAILY PRN
Status: DISCONTINUED | OUTPATIENT
Start: 2025-05-01 | End: 2025-05-03 | Stop reason: HOSPADM

## 2025-05-01 RX ORDER — ENOXAPARIN SODIUM 100 MG/ML
40 INJECTION SUBCUTANEOUS NIGHTLY
Status: DISCONTINUED | OUTPATIENT
Start: 2025-05-01 | End: 2025-05-03 | Stop reason: HOSPADM

## 2025-05-01 RX ORDER — MAGNESIUM SULFATE HEPTAHYDRATE 40 MG/ML
2 INJECTION, SOLUTION INTRAVENOUS ONCE
Status: COMPLETED | OUTPATIENT
Start: 2025-05-01 | End: 2025-05-01

## 2025-05-01 RX ORDER — LEVOTHYROXINE SODIUM 100 UG/1
200 TABLET ORAL
Status: DISCONTINUED | OUTPATIENT
Start: 2025-05-02 | End: 2025-05-03 | Stop reason: HOSPADM

## 2025-05-01 RX ORDER — MIRTAZAPINE 15 MG/1
15 TABLET, FILM COATED ORAL NIGHTLY
Status: DISCONTINUED | OUTPATIENT
Start: 2025-05-01 | End: 2025-05-03 | Stop reason: HOSPADM

## 2025-05-01 RX ORDER — POLYETHYLENE GLYCOL 3350 17 G/17G
17 POWDER, FOR SOLUTION ORAL DAILY PRN
Status: DISCONTINUED | OUTPATIENT
Start: 2025-05-01 | End: 2025-05-03 | Stop reason: HOSPADM

## 2025-05-01 RX ORDER — PANTOPRAZOLE SODIUM 40 MG/1
40 TABLET, DELAYED RELEASE ORAL
Status: DISCONTINUED | OUTPATIENT
Start: 2025-05-02 | End: 2025-05-03 | Stop reason: HOSPADM

## 2025-05-01 RX ORDER — DIPHENHYDRAMINE HYDROCHLORIDE 50 MG/ML
25 INJECTION, SOLUTION INTRAMUSCULAR; INTRAVENOUS ONCE
Status: COMPLETED | OUTPATIENT
Start: 2025-05-01 | End: 2025-05-01

## 2025-05-01 RX ORDER — IOPAMIDOL 755 MG/ML
100 INJECTION, SOLUTION INTRAVASCULAR
Status: COMPLETED | OUTPATIENT
Start: 2025-05-01 | End: 2025-05-01

## 2025-05-01 RX ORDER — KETOROLAC TROMETHAMINE 30 MG/ML
30 INJECTION, SOLUTION INTRAMUSCULAR; INTRAVENOUS EVERY 6 HOURS PRN
Status: DISCONTINUED | OUTPATIENT
Start: 2025-05-01 | End: 2025-05-03 | Stop reason: HOSPADM

## 2025-05-01 RX ORDER — KETOROLAC TROMETHAMINE 30 MG/ML
30 INJECTION, SOLUTION INTRAMUSCULAR; INTRAVENOUS ONCE
Status: COMPLETED | OUTPATIENT
Start: 2025-05-01 | End: 2025-05-01

## 2025-05-01 RX ORDER — AMOXICILLIN 250 MG
2 CAPSULE ORAL 2 TIMES DAILY PRN
Status: DISCONTINUED | OUTPATIENT
Start: 2025-05-01 | End: 2025-05-03 | Stop reason: HOSPADM

## 2025-05-01 RX ORDER — SODIUM CHLORIDE 0.9 % (FLUSH) 0.9 %
10 SYRINGE (ML) INJECTION AS NEEDED
Status: DISCONTINUED | OUTPATIENT
Start: 2025-05-01 | End: 2025-05-03 | Stop reason: HOSPADM

## 2025-05-01 RX ORDER — ONDANSETRON 2 MG/ML
4 INJECTION INTRAMUSCULAR; INTRAVENOUS ONCE
Status: COMPLETED | OUTPATIENT
Start: 2025-05-01 | End: 2025-05-01

## 2025-05-01 RX ORDER — BISACODYL 5 MG/1
5 TABLET, DELAYED RELEASE ORAL DAILY PRN
Status: DISCONTINUED | OUTPATIENT
Start: 2025-05-01 | End: 2025-05-03 | Stop reason: HOSPADM

## 2025-05-01 RX ORDER — PAROXETINE 20 MG/1
20 TABLET, FILM COATED ORAL EVERY MORNING
COMMUNITY

## 2025-05-01 RX ORDER — PAROXETINE 20 MG/1
20 TABLET, FILM COATED ORAL EVERY MORNING
Status: DISCONTINUED | OUTPATIENT
Start: 2025-05-02 | End: 2025-05-03 | Stop reason: HOSPADM

## 2025-05-01 RX ORDER — DIPHENHYDRAMINE HYDROCHLORIDE 50 MG/ML
25 INJECTION, SOLUTION INTRAMUSCULAR; INTRAVENOUS EVERY 6 HOURS PRN
Status: DISCONTINUED | OUTPATIENT
Start: 2025-05-01 | End: 2025-05-03 | Stop reason: HOSPADM

## 2025-05-01 RX ORDER — SODIUM CHLORIDE 9 MG/ML
40 INJECTION, SOLUTION INTRAVENOUS AS NEEDED
Status: DISCONTINUED | OUTPATIENT
Start: 2025-05-01 | End: 2025-05-03 | Stop reason: HOSPADM

## 2025-05-01 RX ADMIN — KETOROLAC TROMETHAMINE 30 MG: 30 INJECTION, SOLUTION INTRAMUSCULAR; INTRAVENOUS at 09:47

## 2025-05-01 RX ADMIN — Medication 10 ML: at 20:38

## 2025-05-01 RX ADMIN — GADOPICLENOL 9 ML: 485.1 INJECTION INTRAVENOUS at 09:26

## 2025-05-01 RX ADMIN — ONDANSETRON 4 MG: 2 INJECTION INTRAMUSCULAR; INTRAVENOUS at 06:53

## 2025-05-01 RX ADMIN — SUMATRIPTAN SUCCINATE 100 MG: 50 TABLET ORAL at 14:07

## 2025-05-01 RX ADMIN — DIPHENHYDRAMINE HYDROCHLORIDE 25 MG: 50 INJECTION, SOLUTION INTRAMUSCULAR; INTRAVENOUS at 09:47

## 2025-05-01 RX ADMIN — IOPAMIDOL 70 ML: 755 INJECTION, SOLUTION INTRAVENOUS at 04:58

## 2025-05-01 RX ADMIN — SODIUM CHLORIDE 1000 ML: 9 INJECTION, SOLUTION INTRAVENOUS at 05:17

## 2025-05-01 RX ADMIN — HYDROCHLOROTHIAZIDE 12.5 MG: 25 TABLET ORAL at 20:38

## 2025-05-01 RX ADMIN — SUMATRIPTAN SUCCINATE 100 MG: 50 TABLET ORAL at 17:46

## 2025-05-01 RX ADMIN — KETOROLAC TROMETHAMINE 30 MG: 30 INJECTION, SOLUTION INTRAMUSCULAR; INTRAVENOUS at 20:38

## 2025-05-01 RX ADMIN — ENOXAPARIN SODIUM 40 MG: 40 INJECTION SUBCUTANEOUS at 20:37

## 2025-05-01 RX ADMIN — IOPAMIDOL 70 ML: 755 INJECTION, SOLUTION INTRAVENOUS at 04:57

## 2025-05-01 RX ADMIN — MORPHINE SULFATE 4 MG: 4 INJECTION, SOLUTION INTRAMUSCULAR; INTRAVENOUS at 06:54

## 2025-05-01 RX ADMIN — SULFUR HEXAFLUORIDE 2 ML: KIT at 15:06

## 2025-05-01 RX ADMIN — MAGNESIUM SULFATE HEPTAHYDRATE 2 G: 40 INJECTION, SOLUTION INTRAVENOUS at 09:48

## 2025-05-01 RX ADMIN — Medication 10 ML: at 12:00

## 2025-05-01 RX ADMIN — MIRTAZAPINE 15 MG: 15 TABLET, FILM COATED ORAL at 20:38

## 2025-05-01 RX ADMIN — AMLODIPINE BESYLATE 10 MG: 10 TABLET ORAL at 12:48

## 2025-05-01 RX ADMIN — LOSARTAN POTASSIUM 100 MG: 50 TABLET, FILM COATED ORAL at 12:48

## 2025-05-01 RX ADMIN — ONDANSETRON 4 MG: 2 INJECTION INTRAMUSCULAR; INTRAVENOUS at 20:38

## 2025-05-01 NOTE — H&P
[facility] Medicine Services  HISTORY & PHYSICAL    Patient Identification:  Name:  Toy Clemons  Age:  55 y.o.  Sex:  male  :  1970  MRN:  [id number]   Visit Number:  [id number]  Admit Date: 2025   Primary Care Physician:  Sloan Yo     Subjective     Chief complaint:   Chief Complaint   Patient presents with    Numbness    Insomnia     History of presenting illness:   Patient is a 55 y.o. male with past medical history significant for COPD, hypertension, hypothyroidism, sleep apnea, past history of alcohol abuse that presented to the [facility] emergency department for evaluation of numbness.    Patient examined at bedside on 3 S.  He reports that he has had significant insomnia over the past several months.  He reports he has been sleeping throughout the day recently and cannot sleep at night.  He states that yesterday evening he woke up and had the worst headache of his life.  He reported to our ER this morning because he felt nauseated, off balance, and had perioral numbness as well as bilateral hand numbness.  He did note sensitivity to light and sound accompanied with a headache.  No prior history of migraines.  Patient reports he has never been told he has had low sodium in the past and does not see a nephrologist or take any medications for this.    Patient's main concern is that he has had worsening insomnia over the past several months.  He states he has not slept in over 48 hours.  He states he has tried trazodone but has not taken it in a few weeks due to it being ineffective.  He states he then tried Restoril and he only got 2 hours of sleep before he woke up again.  He does state that he started Paxil 2 days ago.  He states he has sleep apnea and has used a CPAP for several years.  He states that they tried to redo his sleep study a few nights ago but he was unable to sleep to complete the test.  He did recall that his some insomnia started not  "long after he was switched to losartan/HCTZ for blood pressure control.  He states this was not long after he stopped drinking alcohol.  He states he had a past history of alcohol abuse but had been sober for over a year until a few weeks ago.  He states he recently went on a \"bob\" to try to help him sleep because he was desperate for something to help, but has not drank any alcohol for the past week.    Upon arrival to the ED, vitals were temperature 98.5, HR 90, RR 19, /91, SpO2 98% on room air.  P1 21, potassium 3.4    Patient has been admitted to the telemetry unit.   ---------------------------------------------------------------------------------------------------------------------   Review of Systems   Constitutional:  Negative for chills, diaphoresis, fatigue and fever.   Respiratory:  Negative for cough and shortness of breath.    Cardiovascular:  Negative for chest pain, palpitations and leg swelling.   Gastrointestinal:  Negative for abdominal pain, nausea and vomiting.   Genitourinary:  Negative for difficulty urinating and dysuria.   Musculoskeletal:  Negative for arthralgias, myalgias and neck stiffness.   Skin:  Negative for rash and wound.   Neurological:  Positive for numbness and headaches. Negative for dizziness.   Psychiatric/Behavioral:  Positive for sleep disturbance. Negative for agitation and confusion.       ---------------------------------------------------------------------------------------------------------------------   Past Medical History:   Diagnosis Date    Chronic bronchitis     Disease of thyroid gland     Hypertension     Sleep apnea      Past Surgical History:   Procedure Laterality Date    COLONOSCOPY N/A 3/6/2020    Procedure: COLONOSCOPY;  Surgeon: Juan Ramon Roper MD;  Location: [facility];  Service: Gastroenterology;  Laterality: N/A;    HERNIA REPAIR      SINUS SURGERY       No family history on file.  Social History     Socioeconomic History    Marital status: "    Tobacco Use    Smoking status: Every Day     Current packs/day: 1.00     Average packs/day: 1 pack/day for 30.0 years (30.0 ttl pk-yrs)     Types: Cigarettes     Passive exposure: Current    Smokeless tobacco: Current   Vaping Use    Vaping status: Never Used   Substance and Sexual Activity    Alcohol use: Not Currently     Comment: QUIT FEB 2020    Drug use: Never    Sexual activity: Defer     ---------------------------------------------------------------------------------------------------------------------   Allergies:  Ace inhibitors  ---------------------------------------------------------------------------------------------------------------------   Medications below are reported home medications pulling from within the system; at this time, these medications have not been reconciled unless otherwise specified and are in the verification process for further verifcation as current home medications.    Prior to Admission Medications       Prescriptions Last Dose Informant Patient Reported? Taking?    amLODIPine (NORVASC) 10 MG tablet   Yes No    Take 1 tablet by mouth Daily.    levothyroxine (SYNTHROID, LEVOTHROID) 200 MCG tablet   Yes No    Take 1 tablet by mouth Daily.    losartan (COZAAR) 50 MG tablet   No No    Take 1 tablet by mouth Daily.    omeprazole (priLOSEC) 20 MG capsule   Yes No    Take 1 capsule by mouth Every Morning.          ---------------------------------------------------------------------------------------------------------------------    Objective     Hospital Scheduled Meds:  amLODIPine, 10 mg, Oral, Q24H  enoxaparin sodium, 40 mg, Subcutaneous, Nightly  [START ON 5/2/2025] levothyroxine, 200 mcg, Oral, Q AM  losartan, 100 mg, Oral, Q24H  mirtazapine, 15 mg, Oral, Nightly  sodium chloride, 10 mL, Intravenous, Q12H           Current listed hospital scheduled medications may not yet reflect those currently placed in orders that are signed and held, awaiting patient's arrival to  floor/unit.    ---------------------------------------------------------------------------------------------------------------------   Vital Signs:  Temp:  [97.8 °F (36.6 °C)-98.5 °F (36.9 °C)] 97.8 °F (36.6 °C)  Heart Rate:  [67-90] 67  Resp:  [15-20] 20  BP: (125-180)/(76-99) 138/84  Mean Arterial Pressure (Non-Invasive) for the past 24 hrs (Last 3 readings):   Noninvasive MAP (mmHg)   05/01/25 1201 101   05/01/25 1130 113   05/01/25 1115 111     SpO2 Percentage    05/01/25 1115 05/01/25 1130 05/01/25 1201   SpO2: 95% 95% 93%     SpO2:  [93 %-99 %] 93 %  on   ;   Device (Oxygen Therapy): room air    Body mass index is 33.04 kg/m².  Wt Readings from Last 3 Encounters:   05/01/25 95.7 kg (210 lb 15.7 oz)   04/24/25 90.7 kg (200 lb)   04/17/25 98.9 kg (218 lb)     ---------------------------------------------------------------------------------------------------------------------   Physical Exam:  Physical Exam  Constitutional:       General: He is not in acute distress.     Appearance: Normal appearance.   HENT:      Head: Normocephalic and atraumatic.      Right Ear: External ear normal.      Left Ear: External ear normal.      Nose: No nasal deformity.      Mouth/Throat:      Lips: Pink.      Mouth: Mucous membranes are moist.   Eyes:      Conjunctiva/sclera: Conjunctivae normal.      Pupils: Pupils are equal, round, and reactive to light.   Cardiovascular:      Rate and Rhythm: Normal rate and regular rhythm.      Pulses:           Dorsalis pedis pulses are 2+ on the right side and 2+ on the left side.      Heart sounds: Normal heart sounds.   Pulmonary:      Effort: Pulmonary effort is normal.      Breath sounds: Normal breath sounds. No wheezing, rhonchi or rales.   Abdominal:      General: Abdomen is flat. Bowel sounds are normal.      Palpations: Abdomen is soft.      Tenderness: There is no guarding or rebound.   Genitourinary:     Comments: No wilkinson catheter in place   Musculoskeletal:      Cervical back:  "Neck supple. Normal range of motion.      Right lower leg: No edema.      Left lower leg: No edema.   Skin:     General: Skin is warm and dry.   Neurological:      General: No focal deficit present.      Mental Status: He is alert and oriented to person, place, and time.   Psychiatric:         Mood and Affect: Mood normal.         Behavior: Behavior normal.       ---------------------------------------------------------------------------------------------------------------------  EKG:        I have personally reviewed the EKG/Telemetry strip  ---------------------------------------------------------------------------------------------------------------------   Results from last 7 days   Lab Units 04/24/25  1514 04/24/25  1405   HSTROP T ng/L 8 9           Results from last 7 days   Lab Units 05/01/25 0449 04/24/25  1405   WBC 10*3/mm3 7.48 6.92   HEMOGLOBIN g/dL 15.2 16.3   HEMATOCRIT % 43.6 47.1   MCV fL 85.5 86.6   MCHC g/dL 34.9 34.6   PLATELETS 10*3/mm3 214 333   INR  0.99  --      Results from last 7 days   Lab Units 05/01/25 0449 04/24/25  2251 04/24/25  1832 04/24/25  1405   SODIUM mmol/L 121* 129* 127* 123*   POTASSIUM mmol/L 3.4*  --  4.3 4.0   MAGNESIUM mg/dL  --  1.8  --   --    CHLORIDE mmol/L 86*  --  91* 87*   CO2 mmol/L 23.3  --  24.6 24.4   BUN mg/dL 7  --  2* 2*   CREATININE mg/dL 0.69*  --  0.80 0.70*   CALCIUM mg/dL 9.2  --  8.8 9.5   GLUCOSE mg/dL 109*  --  106* 114*   ALBUMIN g/dL 4.6  --  4.3 4.8   BILIRUBIN mg/dL 1.0  --  0.7 0.6   ALK PHOS U/L 79  --  84 97   AST (SGOT) U/L 92*  --  101* 129*   ALT (SGPT) U/L 246*  --  128* 150*   Estimated Creatinine Clearance: 136.4 mL/min (A) (by C-G formula based on SCr of 0.69 mg/dL (L)).  No results found for: \"AMMONIA\"    Glucose   Date/Time Value Ref Range Status   05/01/2025 0507 107 70 - 130 mg/dL Final     No results found for: \"HGBA1C\"  No results found for: \"TSH\", \"FREET4\"    No results found for: \"BLOODCX\"  No results found for: \"URINECX\"  No " "results found for: \"WOUNDCX\"  No results found for: \"STOOLCX\"  No results found for: \"RESPCX\"  No results found for: \"MRSACX\"  Pain Management Panel          Latest Ref Rng & Units 10/28/2020   Pain Management Panel   Amphetamine, Urine Qual Negative Negative    Barbiturates Screen, Urine Negative Negative    Benzodiazepine Screen, Urine Negative Negative    Buprenorphine, Screen, Urine Negative Negative    Cocaine Screen, Urine Negative Negative    Methadone Screen , Urine Negative Negative      I have personally reviewed the above laboratory results.   ---------------------------------------------------------------------------------------------------------------------  Imaging Results (Last 7 Days)       Procedure Component Value Units Date/Time    MRI Brain With & Without Contrast [[id number]] Collected: 05/01/25 0928     Updated: 05/01/25 0931    Narrative:      EXAM:    MR Head Without and With Intravenous Contrast     EXAM DATE:    5/1/2025 9:28 AM     CLINICAL HISTORY:    Abnormal CT perfusion left temporal lobe; R93.89-Abnormal findings on  diagnostic imaging of other specified body structures; R20.0-Anesthesia  of skin; R20.2-Paresthesia of skin; G47.00-Insomnia, unspecified;  R51.9-Headache, unspecified     TECHNIQUE:    Magnetic resonance images of the head/brain without and with  intravenous contrast in multiple planes.     COMPARISON:    No relevant prior studies available.     FINDINGS:    Brain and extra-axial spaces:  Unremarkable as visualized.  No mass.   No hemorrhage.  No acute infarct.  No ventriculomegaly.    Bones/joints:  Unremarkable as visualized.  No acute fracture.    Sinuses:  Unremarkable as visualized.  No acute sinusitis.    Mastoid air cells:  Bilateral mastoid air cell fluid noted.    Orbits:  Unremarkable as visualized.       Impression:        Bilateral mastoid air cell fluid noted.     This report was finalized on 5/1/2025 9:29 AM by Dr. Moe Murray MD.       XR Chest 1 View " [[id number]] Collected: 05/01/25 0731     Updated: 05/01/25 0733    Narrative:      CLINICAL HISTORY: Weakness.     COMPARISON: None available.     TECHNIQUE:  Single AP view of the chest.     FINDINGS: Lungs are clear. No pleural effusion or pneumothorax is  identified.  Mild cardiomegaly with pulmonary vascular congestion.  No  acute osseous or upper abdominal abnormality is seen.       Impression:         MILD CARDIOMEGALY AND PULMONARY VASCULAR CONGESTION.              This report was finalized on 5/1/2025 7:31 AM by Liliane Meadows MD.       CT CEREBRAL PERFUSION WITH & WITHOUT CONTRAST [[id number]] Collected: 05/01/25 0553     Updated: 05/01/25 0555    Narrative:      CLINICAL HISTORY: Bilateral hand numbness, mouth numbness.     COMPARISON: None available.     TECHNIQUE: IV contrast was administered and dynamic images were acquired  through the brain.  Post processing using RAPID software was performed.   Limited exposure control, adjustment of the mA and/or KV according to  patient size or use of iterative reconstruction technique was utilized.     FINDINGS: In the white matter of the left temporal lobe is a focal area  of prolonged T-max greater than 6 seconds, with a total volume of 4 mL.   No corresponding abnormality on the other sequences.  Additional areas  of periventricular white matter T-max prolongation are noted  predominantly in the temporal and occipital regions.     Discussed with Dr. Francis at 5:49 AM EDT on 5/1/2025.       Impression:      Impression:     APPARENT AREA OF ISCHEMIA IN THE WHITE MATTER OF THE LEFT TEMPORAL LOBE  ON A BACKGROUND OF MILD ELEVATED TMAX.  MOST LIKELY REPRESENTS SMALL  VESSEL ISCHEMIC DISEASE RATHER THAN AN ACUTE EMBOLIC EVENT.  COULD  CONSIDER MRI FOR FURTHER EVALUATION AS WARRANTED CLINICALLY.     This report was finalized on 5/1/2025 5:53 AM by Liliane Meadows MD.       CT Angiogram Head w AI Analysis of LVO [[id number]] Collected: 05/01/25 0549     Updated: 05/01/25  0555    Narrative:      CLINICAL HISTORY: Neurological deficit.     COMPARISON: None available.     TECHNIQUE: IV contrast was administered and helical images were acquired  through the neck and head.  Multiplanar reformats, including multiplanar  MIPS, were obtained.  Percentage of carotid stenosis is based on NASCET  criteria.  Limited exposure control, adjustment of the mA and/or KV  according to patient size or use of iterative reconstruction technique  was utilized.     FINDINGS:     CTA neck:     Aortic arch: normal aortic arch. Three-vessel branching pattern.     Right  Subclavian artery: patent.  Vertebral artery: patent  Common carotid: patent.  Internal carotid: patent.  External carotid: patent.     Left   Subclavian artery: patent.  Vertebral artery: patent  Common carotid: patent.  Internal carotid: patent.  External carotid: patent.     Other: clear lung apices; no acute soft tissue abnormality.        CTA head:     Internal carotid arteries: patent.  Anterior cerebral arteries and main branches: patent.   Middle cerebral arteries and main branches: patent.  Vertebral and basilar arteries: patent.  Posterior cerebral arteries and main branches: patent.       Impression:         CTA NECK:  NEGATIVE FOR HEMODYNAMICALLY SIGNIFICANT STENOSIS.     CTA HEAD:  NEGATIVE FOR LARGE VESSEL OCCLUSION OR OTHER ACUTE VASCULAR ABNORMALITY.     This report was finalized on 5/1/2025 5:52 AM by Liliane Meadows MD.       CT Angiogram Neck [[id number]] Collected: 05/01/25 0549     Updated: 05/01/25 0555    Narrative:      CLINICAL HISTORY: Neurological deficit.     COMPARISON: None available.     TECHNIQUE: IV contrast was administered and helical images were acquired  through the neck and head.  Multiplanar reformats, including multiplanar  MIPS, were obtained.  Percentage of carotid stenosis is based on NASCET  criteria.  Limited exposure control, adjustment of the mA and/or KV  according to patient size or use of  iterative reconstruction technique  was utilized.     FINDINGS:     CTA neck:     Aortic arch: normal aortic arch. Three-vessel branching pattern.     Right  Subclavian artery: patent.  Vertebral artery: patent  Common carotid: patent.  Internal carotid: patent.  External carotid: patent.     Left   Subclavian artery: patent.  Vertebral artery: patent  Common carotid: patent.  Internal carotid: patent.  External carotid: patent.     Other: clear lung apices; no acute soft tissue abnormality.        CTA head:     Internal carotid arteries: patent.  Anterior cerebral arteries and main branches: patent.   Middle cerebral arteries and main branches: patent.  Vertebral and basilar arteries: patent.  Posterior cerebral arteries and main branches: patent.       Impression:         CTA NECK:  NEGATIVE FOR HEMODYNAMICALLY SIGNIFICANT STENOSIS.     CTA HEAD:  NEGATIVE FOR LARGE VESSEL OCCLUSION OR OTHER ACUTE VASCULAR ABNORMALITY.     This report was finalized on 5/1/2025 5:52 AM by Liliane Meadows MD.       CT Head Without Contrast Stroke Protocol [[id number]] Collected: 05/01/25 0521     Updated: 05/01/25 0551    Narrative:      CLINICAL HISTORY: Numbness of mouth and the upper extremities.     COMPARISON: None available.     TECHNIQUE:  Noncontrast head CT was obtained. Multiplanar reformats were  generated. Limited exposure control, adjustment of the mA and/or KV  according to patient size or use of iterative reconstruction technique  was utilized.     FINDINGS: No acute intracranial hemorrhage or extra-axial collection.  Normal gray-white matter differentiation.  No CT findings for acute  territorial infarct.  Normal ventricular size.  No midline shift or mass-effect.  Intact calvarium.    Normal globes and orbits.    Paranasal sinuses and mastoid air cells are clear.  Status post  bilateral antrostomy     Discussed with Dr. Francis at 5:49 AM EDT on 5/1/2025.       Impression:         NO ACUTE INTRACRANIAL ABNORMALITY.      This report was finalized on 5/1/2025 5:49 AM by Liliane Meadows MD.             I have personally reviewed the above radiology results.     Last Echocardiogram:    ---------------------------------------------------------------------------------------------------------------------    Assessment & Plan      Active Hospital Problems    Diagnosis  POA    **Hyponatremia [E87.1]  Yes     Hyponatremia, POA  Perioral numbness  Bilateral hand numbness  Headache  Insomnia  BONITA  History of alcohol use disorder  Hypertension  Patient has been on trazodone and recently started on Paxil.  He also is on hydrochlorothiazide for hypertension.  Medication is on the differential for etiology of hyponatremia  No signs of hypervolemia on exam  No history of hyponatremia.  Will fluid restrict at 1 L daily for now, nephrology consulted, assistance appreciated  Due to the numbness, code stroke was called in the ED.  All imaging of the brain was unremarkable.  EEG pending per neurology recommendations  TTE pending   As needed migraine cocktail per neurology recommendations.  Neurology has started mirtazapine nightly for insomnia.  B12 and folate ordered due to the peripheral numbness  F/E/N: Fluid restriction.  Continue to monitor electrolytes.  Regular diet with 1 L fluid restriction.    ---------------------------------------------------  DVT Prophylaxis: Lovenox  Activity: Ad warner.    The patient is considered to be a high risk patient due to: Hyponatremia     INPATIENT status due to the need for care which can only be reasonably provided in an hospital setting such as aggressive/expedited ancillary services and/or consultation services, the necessity for IV medications, close physician monitoring and/or the possible need for procedures.  In such, I feel patient’s risk for adverse outcomes and need for care warrant INPATIENT evaluation and predict the patient’s care encounter to likely last beyond 2 midnights.      Code Status: Full  code    Disposition/Discharge planning: Pending clinical course    I have discussed the patient's assessment and plan with Dr. Mckenzie Kulkarni PA-C  Hospitalist Service -- [facility]       05/01/25  13:02 EDT    Attending Physician: Thony Rincon,*

## 2025-05-01 NOTE — ED PROVIDER NOTES
Patient Name: Toy Clemons   MRN: [id number]  Age: 55 y.o.  Sex: male  : 1970    Primary Care Physician: Sloan Yo  Referring Physician:  No ref. provider found    TIME STROKE TEAM CALLED: 0457 EST     TIME PATIENT SEEN: 0455 EST    Handedness: rt  Race:     Chief Complaint/Reason for Consultation: Perioral numbness and bilateral hand numbness with bitemporal headache/worst headache of his life    HPI: Patient has had insomnia for 3 days yesterday morning at 7 he says he got a piercing bitemporal headache with perioral numbness and bilateral hand numbness this has been ongoing for almost 24 hours now headache gets worse when he lies down so he has been walking and standing    Last Known Normal Date/Time: 0700 25 EST     Review of Systems   Past Medical History:   Diagnosis Date    Chronic bronchitis     Disease of thyroid gland     Hypertension     Sleep apnea      Past Surgical History:   Procedure Laterality Date    COLONOSCOPY N/A 3/6/2020    Procedure: COLONOSCOPY;  Surgeon: Juan Ramon Roper MD;  Location: [facility];  Service: Gastroenterology;  Laterality: N/A;    HERNIA REPAIR      SINUS SURGERY       No family history on file.  Social History     Socioeconomic History    Marital status:    Tobacco Use    Smoking status: Every Day     Current packs/day: 1.00     Average packs/day: 1 pack/day for 30.0 years (30.0 ttl pk-yrs)     Types: Cigarettes     Passive exposure: Current    Smokeless tobacco: Current   Vaping Use    Vaping status: Never Used   Substance and Sexual Activity    Alcohol use: Not Currently     Comment: QUIT 2020    Drug use: Never    Sexual activity: Defer     Allergies   Allergen Reactions    Ace Inhibitors Angioedema     Prior to Admission medications    Medication Sig Start Date End Date Taking? Authorizing Provider   amLODIPine (NORVASC) 10 MG tablet Take 1 tablet by mouth Daily. 23   Provider, MD Ameena   levothyroxine (SYNTHROID,  LEVOTHROID) 200 MCG tablet Take 1 tablet by mouth Daily. 2/23/20   Ameena Atkinson MD   losartan (COZAAR) 50 MG tablet Take 1 tablet by mouth Daily. 2/25/25   Danuta Tolentino,    omeprazole (priLOSEC) 20 MG capsule Take 1 capsule by mouth Every Morning. 9/25/24   Ameena Atkinson MD         Temp:  [98.5 °F (36.9 °C)] 98.5 °F (36.9 °C)  Heart Rate:  [90] 90  Resp:  [19] 19  BP: (175)/(91) 175/91  Neurological Exam  Mental Status  Alert. Oriented to person, place, time and situation. Recent and remote memory are intact. Speech is normal. Language is fluent with no aphasia. Attention and concentration are normal. Fund of knowledge is appropriate for level of education. Apraxia absent.  At this time patient is having a headache with neurologic deficiencies will do a stroke workup differential includes stroke, carpal tunnel, he denies hyperventilation but this could still be on the differential, neuro telemetry called at 457 he agrees does not believe that this is a stroke does not believe patient needs tPA.  He recommends patient be admitted and will be following patient on admission for further workup.    Cranial Nerves  CN III, IV, VI: Extraocular movements intact bilaterally. Pupils equal round and reactive to light bilaterally.      Physical Exam  Vitals and nursing note reviewed. Exam conducted with a chaperone present.   Constitutional:       Appearance: Normal appearance. He is normal weight.   HENT:      Head: Normocephalic and atraumatic.      Mouth/Throat:      Mouth: Mucous membranes are moist.   Eyes:      Extraocular Movements: Extraocular movements intact.      Conjunctiva/sclera: Conjunctivae normal.      Pupils: Pupils are equal, round, and reactive to light.   Cardiovascular:      Rate and Rhythm: Normal rate and regular rhythm.      Pulses: Normal pulses.      Heart sounds: Normal heart sounds.   Pulmonary:      Effort: Pulmonary effort is normal.      Breath sounds: Normal breath sounds.    Abdominal:      General: Abdomen is flat.      Palpations: Abdomen is soft.   Musculoskeletal:      Cervical back: Neck supple.   Skin:     Capillary Refill: Capillary refill takes less than 2 seconds.   Neurological:      Mental Status: He is alert.      Comments: Bilateral hand numbness perioral numbness   Psychiatric:         Speech: Speech normal.      Comments: Anxious         Acute Stroke Data    Thrombolytic Inclusion / Exclusion Criteria    Time: 06:33 EDT  Person Administering Scale: Liliam Francis MD    Inclusion Criteria  [x]   18 years of age or greater   []   Onset of symptoms < 4.5 hours before beginning treatment (stroke onset = time patient was last seen well or without symptoms).   []   Diagnosis of acute ischemic stroke causing measurable disabling deficit (Complete Hemianopia, Any Aphasia, Visual or Sensory Extinction, Any weakness limiting sustained effort against gravity)   []   Any remaining deficit considered potentially disabling in view of patient and practitioner   Exclusion criteria (Do not proceed with Alteplase if any are checked under exclusion criteria)  [x]   Onset unknown or GREATER than 4.5 hours   []   ICH on CT/MRI   [x]   CT demonstrates hypodensity representing acute or subacute infarct   []   Significant head trauma or prior stroke in the previous 3 months   []   Symptoms suggestive of subarachnoid hemorrhage   []   History of un-ruptured intracranial aneurysm GREATER than 10 mm   []   Recent intracranial or intraspinal surgery within the last 3 months   []   Arterial puncture at a non-compressible site in the previous 7 days   []   Active internal bleeding   []   Acute bleeding tendency   []   Platelet count LESS than 100,000 for known hematological diseases such as leukemia, thrombocytopenia or chronic cirrhosis   []   Current use of anticoagulant with INR GREATER than 1.7 or PT GREATER than 15 seconds, aPTT GREATER than 40 seconds   []   Heparin received within 48 hours,  resulting in abnormally elevated aPTT GREATER than upper limit of normal   []   Current use of direct thrombin inhibitors or direct factor Xa inhibitors in the past 48 hours   []   Elevated blood pressure refractory to treatment (systolic GREATER than 185 mm/Hg or diastolic  GREATER than 110 mm/Hg   []   Suspected infective endocarditis and aortic arch dissection   []   Current use of therapeutic treatment dose of low-molecular-weight heparin (LMWH) within the previous 24 hours   []   Structural GI malignancy or bleed   Relative exclusion for all patients  []   Only minor non-disabling symptoms   []   Pregnancy   []   Seizure at onset with postictal residual neurological impairments   []   Major surgery or previous trauma within past 14 days   []   History of previous spontaneous ICH, intracranial neoplasm, or AV malformation   []   Postpartum (within previous 14 days)   []   Recent GI or urinary tract hemorrhage (within previous 21 days)   []   Recent acute MI (within previous 3 months)   []   History of un-ruptured intracranial aneurysm LESS than 10 mm   []   History of ruptured intracranial aneurysm   []   Blood glucose LESS than 50 mg/dL (2.7 mmol/L)   []   Dural puncture within the last 7 days   []   Known GREATER than 10 cerebral microbleeds   Additional exclusions for patients with symptoms onset between 3 and 4.5 hours.  []   Age > 80.   []   On any anticoagulants regardless of INR  >>> Warfarin (Coumadin), Heparin, Enoxaparin (Lovenox), fondaparinux (Arixtra), bivalirudin (Angiomax), Argatroban, dabigatran (Pradaxa), rivaroxaban (Xarelto), or apixaban (Eliquis)   []   Severe stroke (NIHSS > 25).   []   History of BOTH diabetes and previous ischemic stroke.   []   The risks and benefits have been discussed with the patient or family related to the administration of IV thrombolytic therapy for stroke symptoms.   []   I have discussed and reviewed the patient's case and imaging with the attending prior to IV  thrombolytic therapy.   Not a candidate Time IV thrombolytic administered       Hospital Meds:  Scheduled-      Infusions-     PRNs-   sodium chloride    Functional Status Prior to Current Stroke/Varun Score: Patient performs all his own activities of daily living and works.  Has been having insomnia issues    NIH Stroke Scale  Time: 06:33 EDT  Person Administering Scale: Liliam Francis MD    1a  Level of consciousness: 0=alert; keenly responsive   1b. LOC questions:  0=Answers both questions correctly   1c. LOC commands: 0=Performs both tasks correctly   2.  Best Gaze: 0=normal   3.  Visual: 0=No visual loss   4. Facial Palsy: 0=Normal symmetric movement   5a.  Motor left arm: 0=No drift, limb holds 90 (or 45) degrees for full 10 seconds   5b.  Motor right arm: 0=No drift, limb holds 90 (or 45) degrees for full 10 seconds   6a. motor left le=No drift, limb holds 90 (or 45) degrees for full 10 seconds   6b  Motor right le=No drift, limb holds 90 (or 45) degrees for full 10 seconds   7. Limb Ataxia: 0=Absent   8.  Sensory: 1=Mild to moderate sensory loss; patient feels pinprick is less sharp or is dull on the affected side; there is a loss of superficial pain with pinprick but patient is aware He is being touched   9. Best Language:  0=No aphasia, normal   10. Dysarthria: 0=Normal   11. Extinction and Inattention: 0=No abnormality    Total:   1       Results Reviewed:  I have personally reviewed current lab, radiology, and data and agree with results.         Assessment/Plan:  ED Clinical Impression   ED Clinical Impression:   Abnormal CAT scan  Numbness and tingling in both hands  Insomnia, unspecified type  Acute intractable headache, unspecified headache type          Liliam Francis MD  May 1, 2025  04:49 EDT     Liliam Francis MD  [id number]

## 2025-05-01 NOTE — CONSULTS
DOS: 2025  NAME: Toy Clemons   : 1970  PCP: Sloan Yo  CC: worst headache headache of my life along with numbness around my face and both hands.  Referring MD: Liliam Francis MD    Neurological Problem and Interval History:  55 y.o. right-handed white male with a Hx of hypertension currently on amlodipine 10 mg daily as well as losartan 100 mg daily and also has obstructive sleep apnea has been having severe insomnia problems over the last few months and has not been able to rest well.  Over the last week he has been sleeping incessantly in the daytime and cannot sleep at night and has not been able to function much.  Yesterday he woke up with the worst headache of his life and came here early this morning as he was extremely nauseated and feeling off balance as well as having perioral numbness and numbness in both his hands.  He was given a dose of intravenous morphine and also underwent a CT scan of the brain that was unremarkable and CT angiogram of the head and neck with contrast that did not show any evidence of large vessel occlusion or any aneurysm.  An MRI of the brain was performed with and without contrast that was also unremarkable.  Patient denies any prior history of migraine headaches or headaches as a teenager or as a young child or as a young adult.  However there is a strong family history of migraines on his mother side.  Patient has never experienced this kind of headache before and no migraines in the past.  This time when he had this headache he was also having photophobia and phonophobia and when we turn the lights on in the room he felt that the lights were too bright and worsening the headaches.  Currently the headache level is 4 out of 10.  He was also initially nauseated but he got a dose of Zofran which has helped his nausea.  There is no facial asymmetry noted and no speech impediment or visual impairment noted.  There is no drift noticeable in the upper and lower  extremities and the finger-to-nose coordination and heel-to-shin testing are normal.  No sensory loss is noted.  Patient is able to stand up by the side of the bed independently and the Romberg is negative.  There was no orthostatic blood pressure drop.  He passed his bedside swallow.    Past Medical/Surgical Hx:  Past Medical History:   Diagnosis Date    Chronic bronchitis     Disease of thyroid gland     Hypertension     Sleep apnea      Past Surgical History:   Procedure Laterality Date    COLONOSCOPY N/A 3/6/2020    Procedure: COLONOSCOPY;  Surgeon: Juan Ramon Roper MD;  Location: [facility];  Service: Gastroenterology;  Laterality: N/A;    HERNIA REPAIR      SINUS SURGERY         Review of Systems:    Constitutional: Pleasant gentleman currently sitting up by the side of the bed in no distress.  His body mass index is 33.04 kg/m² and he has been diagnosed with obstructive sleep apnea and uses a CPAP device.  Cardiovascular: No chest pain or palpitations noted.  However he was hypertensive with a blood pressure of 175/91 at the time of presentation that went up to 180/86 and currently down to 130/84.  Respiratory: No shortness of breath noted.  Gastrointestinal: No nausea and vomiting noted.  However, earlier he had been nauseated and was throwing up before coming to the emergency room.  Genitourinary: No bladder incontinence noted.  Musculoskeletal: No aches and pains in the muscles or joints noted.  Dermatological: No skin breakdown noted.  Neurological: No focal neurological deficits with NIH stroke scale of 0.  Psychiatric: No major anxiety or depression noted.  Ophthalmological: No visual changes noted.          Medications On Admission  Medications Prior to Admission   Medication Sig Dispense Refill Last Dose/Taking    amLODIPine (NORVASC) 10 MG tablet Take 1 tablet by mouth Daily.       levothyroxine (SYNTHROID, LEVOTHROID) 200 MCG tablet Take 1 tablet by mouth Daily.       losartan (COZAAR) 50 MG tablet  Take 1 tablet by mouth Daily. 30 tablet 0     omeprazole (priLOSEC) 20 MG capsule Take 1 capsule by mouth Every Morning.      The dose of the losartan has been recently increased from 50 mg to 100 mg daily.    Prior to Admission medications    Medication Sig Start Date End Date Taking? Authorizing Provider   amLODIPine (NORVASC) 10 MG tablet Take 1 tablet by mouth Daily. 9/19/23   Ameena Atkinson MD   levothyroxine (SYNTHROID, LEVOTHROID) 200 MCG tablet Take 1 tablet by mouth Daily. 2/23/20   Ameena Atkinson MD   losartan (COZAAR) 50 MG tablet Take 1 tablet by mouth Daily. 2/25/25   Danuta Tolentino,    omeprazole (priLOSEC) 20 MG capsule Take 1 capsule by mouth Every Morning. 9/25/24   Ameena Atkinson MD        Allergies:  Allergies   Allergen Reactions    Ace Inhibitors Angioedema       Social Hx:  Social History     Socioeconomic History    Marital status:    Tobacco Use    Smoking status: Every Day     Current packs/day: 1.00     Average packs/day: 1 pack/day for 30.0 years (30.0 ttl pk-yrs)     Types: Cigarettes     Passive exposure: Current    Smokeless tobacco: Current   Vaping Use    Vaping status: Never Used   Substance and Sexual Activity    Alcohol use: Not Currently     Comment: QUIT FEB 2020    Drug use: Never    Sexual activity: Defer       Family Hx:  Strong family history of migraines on his mother side of the family.    Review of Imaging (Interpretation of images not reports): IV contrast was administered and helical images were acquired  through the neck and head.  Multiplanar reformats, including multiplanar  MIPS, were obtained.  Percentage of carotid stenosis is based on NASCET  criteria.  Limited exposure control, adjustment of the mA and/or KV  according to patient size or use of iterative reconstruction technique  was utilized.     FINDINGS:     CTA neck:     Aortic arch: normal aortic arch. Three-vessel branching pattern.     Right  Subclavian artery:  patent.  Vertebral artery: patent  Common carotid: patent.  Internal carotid: patent.  External carotid: patent.     Left   Subclavian artery: patent.  Vertebral artery: patent  Common carotid: patent.  Internal carotid: patent.  External carotid: patent.     Other: clear lung apices; no acute soft tissue abnormality.        CTA head:     Internal carotid arteries: patent.  Anterior cerebral arteries and main branches: patent.   Middle cerebral arteries and main branches: patent.  Vertebral and basilar arteries: patent.  Posterior cerebral arteries and main branches: patent.     IMPRESSION:     CTA NECK:  NEGATIVE FOR HEMODYNAMICALLY SIGNIFICANT STENOSIS.     CTA HEAD:  NEGATIVE FOR LARGE VESSEL OCCLUSION OR OTHER ACUTE VASCULAR ABNORMALITY.    CT Head Without Contrast Stroke Protocol  Result Date: 5/1/2025  CLINICAL HISTORY: Numbness of mouth and the upper extremities.  COMPARISON: None available.  TECHNIQUE:  Noncontrast head CT was obtained. Multiplanar reformats were generated. Limited exposure control, adjustment of the mA and/or KV according to patient size or use of iterative reconstruction technique was utilized.  FINDINGS: No acute intracranial hemorrhage or extra-axial collection. Normal gray-white matter differentiation.  No CT findings for acute territorial infarct. Normal ventricular size.  No midline shift or mass-effect. Intact calvarium.  Normal globes and orbits.  Paranasal sinuses and mastoid air cells are clear.  Status post bilateral antrostomy  Discussed with Dr. Francis at 5:49 AM EDT on 5/1/2025.      Impression:  NO ACUTE INTRACRANIAL ABNORMALITY.  This report was finalized on 5/1/2025 5:49 AM by Liliane Meadows MD.           MRI Brain With & Without Contrast  Result Date: 5/1/2025  EXAM:   MR Head Without and With Intravenous Contrast  EXAM DATE:   5/1/2025 9:28 AM  CLINICAL HISTORY:   Abnormal CT perfusion left temporal lobe; R93.89-Abnormal findings on diagnostic imaging of other specified  body structures; R20.0-Anesthesia of skin; R20.2-Paresthesia of skin; G47.00-Insomnia, unspecified; R51.9-Headache, unspecified  TECHNIQUE:   Magnetic resonance images of the head/brain without and with intravenous contrast in multiple planes.  COMPARISON:   No relevant prior studies available.  FINDINGS:   Brain and extra-axial spaces:  Unremarkable as visualized.  No mass. No hemorrhage.  No acute infarct.  No ventriculomegaly.   Bones/joints:  Unremarkable as visualized.  No acute fracture.   Sinuses:  Unremarkable as visualized.  No acute sinusitis.   Mastoid air cells:  Bilateral mastoid air cell fluid noted.   Orbits:  Unremarkable as visualized.      Impression:   Bilateral mastoid air cell fluid noted.  This report was finalized on 5/1/2025 9:29 AM by Dr. Moe Murray MD.         Additional Tests Performed: The CT cerebral perfusion scan was interpreted as follows:      Bilateral hand numbness, mouth numbness.     COMPARISON: None available.     TECHNIQUE: IV contrast was administered and dynamic images were acquired  through the brain.  Post processing using RAPID software was performed.   Limited exposure control, adjustment of the mA and/or KV according to  patient size or use of iterative reconstruction technique was utilized.     FINDINGS: In the white matter of the left temporal lobe is a focal area  of prolonged T-max greater than 6 seconds, with a total volume of 4 mL.   No corresponding abnormality on the other sequences.  Additional areas  of periventricular white matter T-max prolongation are noted  predominantly in the temporal and occipital regions.     Discussed with Dr. Francis at 5:49 AM EDT on 5/1/2025.     IMPRESSION:  Impression:     APPARENT AREA OF ISCHEMIA IN THE WHITE MATTER OF THE LEFT TEMPORAL LOBE  ON A BACKGROUND OF MILD ELEVATED TMAX.  MOST LIKELY REPRESENTS SMALL  VESSEL ISCHEMIC DISEASE RATHER THAN AN ACUTE EMBOLIC EVENT.  COULD  CONSIDER MRI FOR FURTHER EVALUATION AS WARRANTED  "CLINICALLY.            Laboratory Results:   Lab Results   Component Value Date    GLUCOSE 109 (H) 05/01/2025    CALCIUM 9.2 05/01/2025     (L) 05/01/2025    K 3.4 (L) 05/01/2025    CO2 23.3 05/01/2025    CL 86 (L) 05/01/2025    BUN 7 05/01/2025    CREATININE 0.69 (L) 05/01/2025    EGFRIFNONA 111 10/28/2020    BCR 10.1 05/01/2025    ANIONGAP 11.7 05/01/2025     Lab Results   Component Value Date    WBC 7.48 05/01/2025    HGB 15.2 05/01/2025    HCT 43.6 05/01/2025    MCV 85.5 05/01/2025     05/01/2025       Lab Results   Component Value Date    INR 0.99 05/01/2025    PROTIME 13.2 05/01/2025         Physical Examination:  /84 (BP Location: Left arm, Patient Position: Sitting)   Pulse 67   Temp 97.8 °F (36.6 °C) (Oral)   Resp 20   Ht 170.2 cm (67\")   Wt 95.7 kg (210 lb 15.7 oz)   SpO2 93%   BMI 33.04 kg/m²   General Appearance:   Well developed, well nourished, well groomed, alert, and cooperative.  HEENT: Normocephalic.    Neck and Spine: Normal range of motion.  Normal alignment. No mass or tenderness. No bruits.    Extremities:    No edema or deformities. Normal joint ROM.   Skin:    No rashes or birth marks.    Neurological examination:  Higher Integrative  Function: Oriented to time, place and person. Normal registration, recall, attention span and concentration. Normal language including comprehension, spontaneous speech, repetition, reading, writing, naming and vocabulary. No neglect with normal visual-spatial function and construction. Normal fund of knowledge and higher integrative function.  CN II:  Pupils are equal, round, and reactive to light. Normal visual acuity and visual fields.    CN III IV VI: Extraocular movements are full without nystagmus.   CN V:  Normal facial sensation and strength of muscles of mastication.  CN VII:  Facial movements are symmetric. No weakness.  CN VIII: Auditory acuity is normal.  CN IX & X: Symmetric palatal movement.  CN XI:  Sternocleidomastoid " and trapezius are normal.  No weakness.  CN XII:  The tongue is midline.  No atrophy or fasciculations.  Motor:  Normal muscle strength, bulk and tone in upper and lower extremities.  No fasciculations, rigidity, spasticity, or abnormal movements.  Sensation: Normal to light touch, pinprick, vibration, temperature, and proprioception in arms and legs. Normal graphesthesia and no extinction on DSS.  Station and Gait: Normal gait and station.  The Romberg was negative.  Coordination:  Finger to nose test shows no dysmetria.   Heel to shin normal.    NIHSS:    Baseline  0-->Alert: keenly responsive  0-->Answers both questions correctly  0-->Performs both tasks correctly  0=normal  0=No visual loss  0=Normal symmetric movement  0-->No drift: limb holds 90 (or 45) degrees for full 10 secs  0-->No drift: limb holds 90 (or 45) degrees for full 10 secs  0-->No drift: limb holds 90 (or 45) degrees for full 10 secs  0-->No drift: limb holds 90 (or 45) degrees for full 10 secs  0=Absent  0=Normal; no sensory loss  0=No aphasia, normal  0=Normal  0=No abnormality    Total score: 0    Diagnoses / Discussion:  55 y.o. who presents with Sx of hypertensive encephalopathy secondary to insomnia and obstructive sleep apnea.  Patient's wife states that a home sleep study was attempted because he has been diagnosed with obstructive sleep apnea approximately 20 years ago and had to be read titrated to get a new CPAP device.    Plan:  Discussed about quitting smoking altogether.  Keep systolic blood pressure between 120-140 and diastolic between 70-80.  As he passed the bedside swallow he will be getting cardiac diet as well as start on his home medications of amlodipine 10 mg along with losartan 100 mg with 1 dose for each now.  Transthoracic echocardiogram with bubble study to look for any PFO.  EEG to make sure there is no underlying seizure disorder.  Treat him symptomatically for migraine type headaches with migraine cocktail which  includes Toradol 30 mg intravenous every 6 hours along with Benadryl 25 mg intravenous every 6 hours along with Zofran 4 mg intravenous every 6 hours.  If he has a headache level of 5 or greater which is not being relieved with the regimen then to try sumatriptan 100 mg by mouth 1 at the onset of severe headache and if not relieved in 2 hours a second dose not more than 2 in 24 hours.  Discussed about bringing his home CPAP device to use at bedtime.  To try mirtazapine 15 mg at bedtime to help him sleep as he has been having chronic insomnia and he has failed trazodone and other medications as well.  If his insomnia does not get relieved he might have to undergo cognitive behavioral therapy for insomnia.    I have discussed the above with the patient and family.  He will be followed up by our inpatient teleneurology service tomorrow  Time spent with patient: 70 minutes in face-to-face evaluation and management of the patient using the dedicated telemedicine device without any interruption with the help of TIBURCIO Canales with the patient located at the [facility] and myself at a remote location.    Electronically signed by Cathy Macario MD, 05/01/25, 12:12 PM EDT.    Dictated using Dragon dictation.

## 2025-05-02 LAB
ALBUMIN SERPL-MCNC: 4.6 G/DL (ref 3.5–5.2)
ALBUMIN/GLOB SERPL: 1.6 G/DL
ALP SERPL-CCNC: 88 U/L (ref 39–117)
ALT SERPL W P-5'-P-CCNC: 200 U/L (ref 1–41)
ANION GAP SERPL CALCULATED.3IONS-SCNC: 10.7 MMOL/L (ref 5–15)
ANION GAP SERPL CALCULATED.3IONS-SCNC: 11.1 MMOL/L (ref 5–15)
ANION GAP SERPL CALCULATED.3IONS-SCNC: 11.3 MMOL/L (ref 5–15)
ANION GAP SERPL CALCULATED.3IONS-SCNC: 12.1 MMOL/L (ref 5–15)
ANION GAP SERPL CALCULATED.3IONS-SCNC: 13.8 MMOL/L (ref 5–15)
ANION GAP SERPL CALCULATED.3IONS-SCNC: 9.8 MMOL/L (ref 5–15)
AST SERPL-CCNC: 83 U/L (ref 1–40)
BILIRUB SERPL-MCNC: 0.8 MG/DL (ref 0–1.2)
BUN SERPL-MCNC: 11 MG/DL (ref 6–20)
BUN SERPL-MCNC: 12 MG/DL (ref 6–20)
BUN SERPL-MCNC: 9 MG/DL (ref 6–20)
BUN SERPL-MCNC: 9 MG/DL (ref 6–20)
BUN/CREAT SERPL: 10.1 (ref 7–25)
BUN/CREAT SERPL: 11.8 (ref 7–25)
BUN/CREAT SERPL: 11.8 (ref 7–25)
BUN/CREAT SERPL: 12.6 (ref 7–25)
BUN/CREAT SERPL: 12.8 (ref 7–25)
BUN/CREAT SERPL: 9.7 (ref 7–25)
CALCIUM SPEC-SCNC: 8.8 MG/DL (ref 8.6–10.5)
CALCIUM SPEC-SCNC: 9 MG/DL (ref 8.6–10.5)
CALCIUM SPEC-SCNC: 9.1 MG/DL (ref 8.6–10.5)
CALCIUM SPEC-SCNC: 9.1 MG/DL (ref 8.6–10.5)
CALCIUM SPEC-SCNC: 9.2 MG/DL (ref 8.6–10.5)
CALCIUM SPEC-SCNC: 9.2 MG/DL (ref 8.6–10.5)
CHLORIDE SERPL-SCNC: 94 MMOL/L (ref 98–107)
CHLORIDE SERPL-SCNC: 95 MMOL/L (ref 98–107)
CHLORIDE SERPL-SCNC: 95 MMOL/L (ref 98–107)
CHLORIDE SERPL-SCNC: 96 MMOL/L (ref 98–107)
CHLORIDE SERPL-SCNC: 96 MMOL/L (ref 98–107)
CHLORIDE SERPL-SCNC: 97 MMOL/L (ref 98–107)
CO2 SERPL-SCNC: 23.7 MMOL/L (ref 22–29)
CO2 SERPL-SCNC: 24.2 MMOL/L (ref 22–29)
CO2 SERPL-SCNC: 24.3 MMOL/L (ref 22–29)
CO2 SERPL-SCNC: 25.9 MMOL/L (ref 22–29)
CO2 SERPL-SCNC: 25.9 MMOL/L (ref 22–29)
CO2 SERPL-SCNC: 27.2 MMOL/L (ref 22–29)
CREAT SERPL-MCNC: 0.86 MG/DL (ref 0.76–1.27)
CREAT SERPL-MCNC: 0.89 MG/DL (ref 0.76–1.27)
CREAT SERPL-MCNC: 0.93 MG/DL (ref 0.76–1.27)
CREAT SERPL-MCNC: 0.95 MG/DL (ref 0.76–1.27)
CREAT UR-MCNC: 93.3 MG/DL
EGFRCR SERPLBLD CKD-EPI 2021: 102.5 ML/MIN/1.73
EGFRCR SERPLBLD CKD-EPI 2021: 103.5 ML/MIN/1.73
EGFRCR SERPLBLD CKD-EPI 2021: 95.7 ML/MIN/1.73
EGFRCR SERPLBLD CKD-EPI 2021: 98.2 ML/MIN/1.73
FOLATE SERPL-MCNC: 8.48 NG/ML (ref 4.78–24.2)
GLOBULIN UR ELPH-MCNC: 2.9 GM/DL
GLUCOSE SERPL-MCNC: 125 MG/DL (ref 65–99)
GLUCOSE SERPL-MCNC: 84 MG/DL (ref 65–99)
GLUCOSE SERPL-MCNC: 85 MG/DL (ref 65–99)
GLUCOSE SERPL-MCNC: 87 MG/DL (ref 65–99)
GLUCOSE SERPL-MCNC: 89 MG/DL (ref 65–99)
GLUCOSE SERPL-MCNC: 94 MG/DL (ref 65–99)
OSMOLALITY SERPL: 257 MOSM/KG (ref 275–300)
OSMOLALITY UR: 281 MOSM/KG
POTASSIUM SERPL-SCNC: 3.9 MMOL/L (ref 3.5–5.2)
POTASSIUM SERPL-SCNC: 3.9 MMOL/L (ref 3.5–5.2)
POTASSIUM SERPL-SCNC: 4 MMOL/L (ref 3.5–5.2)
POTASSIUM SERPL-SCNC: 4.1 MMOL/L (ref 3.5–5.2)
PROT SERPL-MCNC: 7.5 G/DL (ref 6–8.5)
SODIUM SERPL-SCNC: 129 MMOL/L (ref 136–145)
SODIUM SERPL-SCNC: 130 MMOL/L (ref 136–145)
SODIUM SERPL-SCNC: 132 MMOL/L (ref 136–145)
SODIUM SERPL-SCNC: 134 MMOL/L (ref 136–145)
T4 FREE SERPL-MCNC: 1.04 NG/DL (ref 0.92–1.68)
TSH SERPL DL<=0.05 MIU/L-ACNC: 8.96 UIU/ML (ref 0.27–4.2)
VIT B12 BLD-MCNC: 1336 PG/ML (ref 211–946)

## 2025-05-02 PROCEDURE — 82746 ASSAY OF FOLIC ACID SERUM: CPT

## 2025-05-02 PROCEDURE — 82607 VITAMIN B-12: CPT

## 2025-05-02 PROCEDURE — 25010000002 ENOXAPARIN PER 10 MG: Performed by: INTERNAL MEDICINE

## 2025-05-02 PROCEDURE — 25010000003 DEXTROSE 5 % SOLUTION: Performed by: INTERNAL MEDICINE

## 2025-05-02 PROCEDURE — 84439 ASSAY OF FREE THYROXINE: CPT | Performed by: INTERNAL MEDICINE

## 2025-05-02 PROCEDURE — 99232 SBSQ HOSP IP/OBS MODERATE 35: CPT

## 2025-05-02 PROCEDURE — 99232 SBSQ HOSP IP/OBS MODERATE 35: CPT | Performed by: PSYCHIATRY & NEUROLOGY

## 2025-05-02 PROCEDURE — 80053 COMPREHEN METABOLIC PANEL: CPT | Performed by: INTERNAL MEDICINE

## 2025-05-02 PROCEDURE — 84443 ASSAY THYROID STIM HORMONE: CPT | Performed by: INTERNAL MEDICINE

## 2025-05-02 RX ORDER — DEXTROSE MONOHYDRATE 50 MG/ML
100 INJECTION, SOLUTION INTRAVENOUS CONTINUOUS
Status: ACTIVE | OUTPATIENT
Start: 2025-05-02 | End: 2025-05-02

## 2025-05-02 RX ADMIN — Medication 10 ML: at 08:23

## 2025-05-02 RX ADMIN — DEXTROSE MONOHYDRATE 100 ML/HR: 50 INJECTION, SOLUTION INTRAVENOUS at 08:21

## 2025-05-02 RX ADMIN — LEVOTHYROXINE SODIUM 200 MCG: 0.1 TABLET ORAL at 06:12

## 2025-05-02 RX ADMIN — LOSARTAN POTASSIUM 100 MG: 50 TABLET, FILM COATED ORAL at 08:20

## 2025-05-02 RX ADMIN — ENOXAPARIN SODIUM 40 MG: 40 INJECTION SUBCUTANEOUS at 21:49

## 2025-05-02 RX ADMIN — PAROXETINE HYDROCHLORIDE 20 MG: 20 TABLET, FILM COATED ORAL at 06:12

## 2025-05-02 RX ADMIN — AMLODIPINE BESYLATE 10 MG: 10 TABLET ORAL at 08:21

## 2025-05-02 RX ADMIN — HYDROCHLOROTHIAZIDE 12.5 MG: 25 TABLET ORAL at 08:20

## 2025-05-02 RX ADMIN — PANTOPRAZOLE SODIUM 40 MG: 40 TABLET, DELAYED RELEASE ORAL at 06:12

## 2025-05-02 RX ADMIN — MIRTAZAPINE 15 MG: 15 TABLET, FILM COATED ORAL at 21:49

## 2025-05-02 RX ADMIN — Medication 10 ML: at 21:49

## 2025-05-02 NOTE — PROGRESS NOTES
[facility] Medicine Services  PROGRESS NOTE     Patient Identification:  Name:  Toy Clemons  Age:  55 y.o.  Sex:  male  :  1970  MRN:  [id number]  Visit Number:  [id number]  Primary Care Provider:  Sloan Yo    Length of stay:  1    ----------------------------------------------------------------------------------------------------------------------  Subjective     Chief Complaint:  Follow up for numbness      History of Presenting Illness/Hospital Course:  Patient is a 55 y.o. male with past medical history significant for COPD, hypertension, hypothyroidism, sleep apnea, past history of alcohol abuse that presented to the [facility] emergency department for evaluation of numbness.       Subjective:  Today, the patient examined at bedside on 3 S.  Patient was notably in much better spirits today.  He states he was able to get a full night's sleep after taking the Remeron last night which is the first full night of sleep he has had in over a month.    Present during exam: Family members  ----------------------------------------------------------------------------------------------------------------------  Objective     Consults:  Nephrology  Neurology    Current Hospital Meds:  amLODIPine, 10 mg, Oral, Q24H  enoxaparin sodium, 40 mg, Subcutaneous, Nightly  hydroCHLOROthiazide, 12.5 mg, Oral, Daily  levothyroxine, 200 mcg, Oral, Q AM  losartan, 100 mg, Oral, Q24H  mirtazapine, 15 mg, Oral, Nightly  pantoprazole, 40 mg, Oral, Q AM  PARoxetine, 20 mg, Oral, QAM  sodium chloride, 10 mL, Intravenous, Q12H      dextrose, 100 mL/hr, Last Rate: 100 mL/hr (25)      ----------------------------------------------------------------------------------------------------------------------  Vital Signs:  Temp:  [97.2 °F (36.2 °C)-98 °F (36.7 °C)] 97.2 °F (36.2 °C)  Heart Rate:  [66-78] 72  Resp:  [18-20] 18  BP: (121-149)/(70-95) 121/70  Mean Arterial Pressure  (Non-Invasive) for the past 24 hrs (Last 3 readings):   Noninvasive MAP (mmHg)   05/02/25 0329 92   05/01/25 2302 101   05/01/25 1957 91     SpO2 Percentage    05/01/25 1957 05/01/25 2302 05/02/25 0600   SpO2: 98% 93% 98%     SpO2:  [93 %-99 %] 98 %  on   ;   Device (Oxygen Therapy): CPAP    Body mass index is 33.04 kg/m².  Wt Readings from Last 3 Encounters:   05/02/25 95.7 kg (210 lb 15.7 oz)   04/24/25 90.7 kg (200 lb)   04/17/25 98.9 kg (218 lb)        Intake/Output Summary (Last 24 hours) at 5/2/2025 0948  Last data filed at 5/1/2025 1407  Gross per 24 hour   Intake 240 ml   Output 900 ml   Net -660 ml     Diet: Regular/House, Fluid Restriction (240 mL/tray); 1000 mL/day; Texture: Regular (IDDSI 7); Fluid Consistency: Thin (IDDSI 0)  ----------------------------------------------------------------------------------------------------------------------  Physical exam:  Physical Exam  Constitutional:       General: He is not in acute distress.     Appearance: Normal appearance.   HENT:      Head: Normocephalic and atraumatic.      Right Ear: External ear normal.      Left Ear: External ear normal.      Nose: No nasal deformity.      Mouth/Throat:      Lips: Pink.      Mouth: Mucous membranes are moist.   Eyes:      Conjunctiva/sclera: Conjunctivae normal.      Pupils: Pupils are equal, round, and reactive to light.      Comments: Exophthalmos   Cardiovascular:      Rate and Rhythm: Normal rate and regular rhythm.      Pulses:           Dorsalis pedis pulses are 2+ on the right side and 2+ on the left side.      Heart sounds: Normal heart sounds.   Pulmonary:      Effort: Pulmonary effort is normal.      Breath sounds: Normal breath sounds. No wheezing, rhonchi or rales.   Abdominal:      General: Abdomen is flat. Bowel sounds are normal.      Palpations: Abdomen is soft.      Tenderness: There is no guarding or rebound.   Genitourinary:     Comments: No wilkinson catheter in place   Musculoskeletal:      Cervical  "back: Neck supple. Normal range of motion.      Right lower leg: No edema.      Left lower leg: No edema.   Skin:     General: Skin is warm and dry.   Neurological:      General: No focal deficit present.      Mental Status: He is alert and oriented to person, place, and time.   Psychiatric:         Mood and Affect: Mood normal.         Behavior: Behavior normal.        ----------------------------------------------------------------------------------------------------------------------  Tele:        I have personally reviewed the EKG/Telemetry strips   ----------------------------------------------------------------------------------------------------------------------            Results from last 7 days   Lab Units 05/01/25  0449   WBC 10*3/mm3 7.48   HEMOGLOBIN g/dL 15.2   HEMATOCRIT % 43.6   MCV fL 85.5   MCHC g/dL 34.9   PLATELETS 10*3/mm3 214   INR  0.99     Results from last 7 days   Lab Units 05/02/25  0551 05/02/25  0101 05/01/25  1746 05/01/25  1223 05/01/25  0449   SODIUM mmol/L 134* 130* 127*   < > 121*   POTASSIUM mmol/L 4.1 4.0 3.7   < > 3.4*   CHLORIDE mmol/L 97* 95* 93*   < > 86*   CO2 mmol/L 27.2 23.7 22.2   < > 23.3   BUN mg/dL 9 9 8   < > 7   CREATININE mg/dL 0.93 0.89 0.94   < > 0.69*   CALCIUM mg/dL 9.2 9.1 9.3   < > 9.2   GLUCOSE mg/dL 94 85 131*   < > 109*   ALBUMIN g/dL  --   --   --   --  4.6   BILIRUBIN mg/dL  --   --   --   --  1.0   ALK PHOS U/L  --   --   --   --  79   AST (SGOT) U/L  --   --   --   --  92*   ALT (SGPT) U/L  --   --   --   --  246*    < > = values in this interval not displayed.   Estimated Creatinine Clearance: 101.2 mL/min (by C-G formula based on SCr of 0.93 mg/dL).  No results found for: \"AMMONIA\"    Glucose   Date/Time Value Ref Range Status   05/01/2025 0507 107 70 - 130 mg/dL Final     No results found for: \"HGBA1C\"  No results found for: \"TSH\", \"FREET4\"    No results found for: \"BLOODCX\"  No results found for: \"URINECX\"  No results found for: \"WOUNDCX\"  No " "results found for: \"STOOLCX\"  No results found for: \"RESPCX\"  Pain Management Panel  More data may exist         Latest Ref Rng & Units 5/1/2025 10/28/2020   Pain Management Panel   Creatinine, Urine mg/dL 93.3  -   Amphetamine, Urine Qual Negative Negative  Negative    Barbiturates Screen, Urine Negative Negative  Negative    Benzodiazepine Screen, Urine Negative Positive  Negative    Buprenorphine, Screen, Urine Negative Negative  Negative    Cocaine Screen, Urine Negative Negative  Negative    Fentanyl, Urine Negative Negative  -   Methadone Screen , Urine Negative Negative  Negative    Methamphetamine, Ur Negative Negative  -     I have personally reviewed the above laboratory results.   ----------------------------------------------------------------------------------------------------------------------  Imaging Results (Last 24 Hours)       ** No results found for the last 24 hours. **          I have personally reviewed the above radiology results.   ----------------------------------------------------------------------------------------------------------------------  Assessment/Plan     Active Hospital Problems    Diagnosis  POA    **Hyponatremia [E87.1]  Yes     Hyponatremia, POA  Perioral numbness  Bilateral hand numbness  Headache  Insomnia  BONITA  History of alcohol use disorder  Hypertension  Patient has been on trazodone and recently started on Paxil.  He also is on hydrochlorothiazide for hypertension.  Medication is on the differential for etiology of hyponatremia  Will fluid restrict at 1 L daily for now  Nephrology consulted, assistance appreciated. Sodium rapidly corrected overnight, Na 134 this morning. Nephrology started D5W this am.  Changed fluid restriction to 1.5L  Due to the numbness, code stroke was called in the ED.  All imaging of the brain was unremarkable. EEG normal   TTE LVEF 61 to 65%.  As needed migraine cocktail per neurology recommendations.  Neurology has started mirtazapine " nightly for insomnia.  Patient was able to sleep overnight after receiving this medication.  B12 and folate ordered due to the peripheral numbness  Nephrology did recommend continuing hydrochlorothiazide but discontinuing the Remeron.  Given that this is the first agent that has helped with patient's insomnia, will instead hold the hydrochlorothiazide and start an alternative antihypertensive agent if needed, will continue Remeron cautiously.  Plan to follow-up with nephrology/PCP within a week after discharge to recheck sodium    --------------------------------------------------  DVT Prophylaxis: Lovenox  FEN:  IV fluids per neurology's recommendations. Replace electrolytes per protocol as necessary.  Regular diet with 1.5 L fluid restriction  Activity: Ad warner.  Disposition: Likely discharge tomorrow pending repeat labs  --------------------------------------------------    The patient is high risk due to the following diagnoses/reasons: Hyponatremia    I have discussed the patient's assessment and plan with the patient and Dr. Mckenzie Kulkarni PA-C  Hospitalist Service -- [facility]       05/02/25  09:48 EDT    Attending Physician: Thony Rincon,*

## 2025-05-02 NOTE — PLAN OF CARE
Goal Outcome Evaluation:   Patient currently resting in bed. A&Ox 4. VSS. No visible s/s of acute distress noted at this time. No requests or complaints at this time. Call light within reach. Plan of care ongoing.

## 2025-05-02 NOTE — CONSULTS
NEPHROLOGY CONSULT NOTE      REASON FOR CONSULTATION: Low sodium  CHIEF COMPLAINT: Not feeling well, numbness in face and hands    HISTORY OF PRESENTING ILLNESS:  Toy Clemons is a 55 y.o. male who presented to [facility] emergency department with chief complaint of numbness around the face and hands started a day before presenting to hospital.  Patient stated he started having nausea and severe headache for past 2 to 3 days and has been taking ibuprofen for headaches and he was not able to eat anything but drink water.  The nausea was associated with generalized weakness fatigability but not associated any diarrhea or vomiting.  Patient was admitted as a stroke code initial evaluation has been negative so far for acute stroke.  Patient received a liter of IV fluid bolus in the emergency department and yesterday 5 AM sodium found to be 121 that has increased to 1:31 AM this morning.  Patient stated his nausea has completely resolved and he is restricting himself on fluid 1 L in 24 hours.  Patient has been started on mirtazapine by neurology for sleep  Patient denied chronic NSAIDS use. Patient denies hematuria, dysuria, difficulty passing urine. No prior history of renal stones. No family history of renal disease    History  Past Medical History:   Diagnosis Date    Chronic bronchitis     Disease of thyroid gland     Hypertension     Sleep apnea    ,   Past Surgical History:   Procedure Laterality Date    COLONOSCOPY N/A 3/6/2020    Procedure: COLONOSCOPY;  Surgeon: Juan Ramon Roper MD;  Location: [facility];  Service: Gastroenterology;  Laterality: N/A;    HERNIA REPAIR      SINUS SURGERY     , History reviewed. No pertinent family history.,   Social History     Tobacco Use    Smoking status: Every Day     Current packs/day: 1.00     Average packs/day: 1 pack/day for 30.0 years (30.0 ttl pk-yrs)     Types: Cigarettes     Passive exposure: Current    Smokeless tobacco: Current   Vaping Use    Vaping  status: Never Used   Substance Use Topics    Alcohol use: Not Currently     Comment: QUIT FEB 2020    Drug use: Never   ,   Medications Prior to Admission   Medication Sig Dispense Refill Last Dose/Taking    levothyroxine (SYNTHROID, LEVOTHROID) 200 MCG tablet Take 1 tablet by mouth Daily.   4/30/2025    losartan-HCTZ (HYZAAR) 100-12.5 combo dose Take 1 dose by mouth Daily.   4/30/2025    omeprazole (priLOSEC) 20 MG capsule Take 1 capsule by mouth Every Morning.   4/30/2025    PARoxetine (PAXIL) 20 MG tablet Take 1 tablet by mouth Every Morning.   4/30/2025   , Scheduled Meds:  amLODIPine, 10 mg, Oral, Q24H  enoxaparin sodium, 40 mg, Subcutaneous, Nightly  hydroCHLOROthiazide, 12.5 mg, Oral, Daily  levothyroxine, 200 mcg, Oral, Q AM  losartan, 100 mg, Oral, Q24H  mirtazapine, 15 mg, Oral, Nightly  pantoprazole, 40 mg, Oral, Q AM  PARoxetine, 20 mg, Oral, QAM  sodium chloride, 10 mL, Intravenous, Q12H    , Continuous Infusions:  dextrose, 100 mL/hr, Last Rate: 100 mL/hr (05/02/25 0821)    , PRN Meds:    senna-docusate sodium **AND** polyethylene glycol **AND** bisacodyl **AND** bisacodyl    diphenhydrAMINE    ketorolac    ondansetron    sodium chloride    sodium chloride    sodium chloride    SUMAtriptan    zolpidem and Allergies:  Ace inhibitors    REVIEW OF SYSTEMS  More than 10 point review of systems was done. Pertinent items are noted in HPI, all other systems reviewed and negative    Objective     Intake/Output                   05/01/25 0701 - 05/02/25 0700     [id number] Total              Intake    P.O.  240  -- 240    Total Intake 240 -- 240       Output    Urine  900  -- 900    Total Output 900 -- 900             VITAL SIGNS  Temp:  [97.2 °F (36.2 °C)-98 °F (36.7 °C)] 97.2 °F (36.2 °C)  Heart Rate:  [66-78] 72  Resp:  [18-20] 18  BP: (121-166)/(70-99) 121/70    PHYSICAL EXAMINATION:    GENERAL EXAM  Laying in bed  Comfortable    MENTAL STATUS EXAM  Alert and oriented, able to answer  "questions    NECK EXAM  JVP is not elevated, carotid exam normal    CARDIOVASCULAR EXAM  Regular rate and rhythm, no murmurs noted, no added heart sounds, normal apical pulsation  no edema in the lower extremities  2+ radial pulses bilateral, 2+ femoral/tibial pulses bilaterally    MUSCULOSKELETAL EXAM  No cyanosis or clubbing noted in the digits    RESPIRATORY EXAM  Lungs clear to auscultation bilaterally, respiratory effort normal    ABDOMINAL EXAM  Abdomen soft and non tender, normal bowel sounds    SKIN EXAM  No new rashes    CLINICAL DATA REVIEW :    CBC, Renal functions, Serum electrolytes, Acid base labs reviewed 1  12 lead EKG was reviewed and demonstrated sinus rhythm at a rate of 75  Independent review of CXR and demonstrated findings consistent with; mild cardiomegaly with no pulmonary edema 2  Discussed the labs with Dr. Rincon 1      WBC WBC   Date Value Ref Range Status   05/01/2025 7.48 3.40 - 10.80 10*3/mm3 Final      HGB Hemoglobin   Date Value Ref Range Status   05/01/2025 15.2 13.0 - 17.7 g/dL Final      HCT Hematocrit   Date Value Ref Range Status   05/01/2025 43.6 37.5 - 51.0 % Final      Platlets No results found for: \"LABPLAT\"   MCV MCV   Date Value Ref Range Status   05/01/2025 85.5 79.0 - 97.0 fL Final          Sodium Sodium   Date Value Ref Range Status   05/02/2025 134 (L) 136 - 145 mmol/L Final   05/02/2025 130 (L) 136 - 145 mmol/L Final   05/01/2025 127 (L) 136 - 145 mmol/L Final   05/01/2025 125 (L) 136 - 145 mmol/L Final   05/01/2025 121 (L) 136 - 145 mmol/L Final      Potassium Potassium   Date Value Ref Range Status   05/02/2025 4.1 3.5 - 5.2 mmol/L Final     Comment:     Slight hemolysis detected by analyzer. Result may be falsely elevated.   05/02/2025 4.0 3.5 - 5.2 mmol/L Final     Comment:     Slight hemolysis detected by analyzer. Result may be falsely elevated.   05/01/2025 3.7 3.5 - 5.2 mmol/L Final   05/01/2025 4.2 3.5 - 5.2 mmol/L Final     Comment:     Specimen hemolyzed.  " "Result may be falsely elevated.  1+ Hemolysis     05/01/2025 3.4 (L) 3.5 - 5.2 mmol/L Final     Comment:     Slight hemolysis detected by analyzer. Result may be falsely elevated.      Chloride Chloride   Date Value Ref Range Status   05/02/2025 97 (L) 98 - 107 mmol/L Final   05/02/2025 95 (L) 98 - 107 mmol/L Final   05/01/2025 93 (L) 98 - 107 mmol/L Final   05/01/2025 91 (L) 98 - 107 mmol/L Final   05/01/2025 86 (L) 98 - 107 mmol/L Final      CO2 CO2   Date Value Ref Range Status   05/02/2025 27.2 22.0 - 29.0 mmol/L Final   05/02/2025 23.7 22.0 - 29.0 mmol/L Final   05/01/2025 22.2 22.0 - 29.0 mmol/L Final   05/01/2025 21.6 (L) 22.0 - 29.0 mmol/L Final   05/01/2025 23.3 22.0 - 29.0 mmol/L Final      BUN BUN   Date Value Ref Range Status   05/02/2025 9 6 - 20 mg/dL Final   05/02/2025 9 6 - 20 mg/dL Final   05/01/2025 8 6 - 20 mg/dL Final   05/01/2025 6 6 - 20 mg/dL Final   05/01/2025 7 6 - 20 mg/dL Final      Creatinine Creatinine   Date Value Ref Range Status   05/02/2025 0.93 0.76 - 1.27 mg/dL Final   05/02/2025 0.89 0.76 - 1.27 mg/dL Final   05/01/2025 0.94 0.76 - 1.27 mg/dL Final   05/01/2025 0.75 (L) 0.76 - 1.27 mg/dL Final   05/01/2025 0.69 (L) 0.76 - 1.27 mg/dL Final      Calcium Calcium   Date Value Ref Range Status   05/02/2025 9.2 8.6 - 10.5 mg/dL Final   05/02/2025 9.1 8.6 - 10.5 mg/dL Final   05/01/2025 9.3 8.6 - 10.5 mg/dL Final   05/01/2025 9.2 8.6 - 10.5 mg/dL Final   05/01/2025 9.2 8.6 - 10.5 mg/dL Final      PO4 No results found for: \"CAPO4\"   Albumin Albumin   Date Value Ref Range Status   05/01/2025 4.6 3.5 - 5.2 g/dL Final      Magnesium No results found for: \"MG\"   Uric Acid No results found for: \"URICACID\"     Radiology results :     Imaging Results (Last 72 Hours)       Procedure Component Value Units Date/Time    MRI Brain With & Without Contrast [[id number]] Collected: 05/01/25 0928     Updated: 05/01/25 0931    Narrative:      EXAM:    MR Head Without and With Intravenous Contrast     EXAM " DATE:    5/1/2025 9:28 AM     CLINICAL HISTORY:    Abnormal CT perfusion left temporal lobe; R93.89-Abnormal findings on  diagnostic imaging of other specified body structures; R20.0-Anesthesia  of skin; R20.2-Paresthesia of skin; G47.00-Insomnia, unspecified;  R51.9-Headache, unspecified     TECHNIQUE:    Magnetic resonance images of the head/brain without and with  intravenous contrast in multiple planes.     COMPARISON:    No relevant prior studies available.     FINDINGS:    Brain and extra-axial spaces:  Unremarkable as visualized.  No mass.   No hemorrhage.  No acute infarct.  No ventriculomegaly.    Bones/joints:  Unremarkable as visualized.  No acute fracture.    Sinuses:  Unremarkable as visualized.  No acute sinusitis.    Mastoid air cells:  Bilateral mastoid air cell fluid noted.    Orbits:  Unremarkable as visualized.       Impression:        Bilateral mastoid air cell fluid noted.     This report was finalized on 5/1/2025 9:29 AM by Dr. Moe Murray MD.       XR Chest 1 View [[id number]] Collected: 05/01/25 0731     Updated: 05/01/25 0733    Narrative:      CLINICAL HISTORY: Weakness.     COMPARISON: None available.     TECHNIQUE:  Single AP view of the chest.     FINDINGS: Lungs are clear. No pleural effusion or pneumothorax is  identified.  Mild cardiomegaly with pulmonary vascular congestion.  No  acute osseous or upper abdominal abnormality is seen.       Impression:         MILD CARDIOMEGALY AND PULMONARY VASCULAR CONGESTION.              This report was finalized on 5/1/2025 7:31 AM by Liliane Meadows MD.       CT CEREBRAL PERFUSION WITH & WITHOUT CONTRAST [[id number]] Collected: 05/01/25 0553     Updated: 05/01/25 0555    Narrative:      CLINICAL HISTORY: Bilateral hand numbness, mouth numbness.     COMPARISON: None available.     TECHNIQUE: IV contrast was administered and dynamic images were acquired  through the brain.  Post processing using RAPID software was performed.   Limited exposure  control, adjustment of the mA and/or KV according to  patient size or use of iterative reconstruction technique was utilized.     FINDINGS: In the white matter of the left temporal lobe is a focal area  of prolonged T-max greater than 6 seconds, with a total volume of 4 mL.   No corresponding abnormality on the other sequences.  Additional areas  of periventricular white matter T-max prolongation are noted  predominantly in the temporal and occipital regions.     Discussed with Dr. Francis at 5:49 AM EDT on 5/1/2025.       Impression:      Impression:     APPARENT AREA OF ISCHEMIA IN THE WHITE MATTER OF THE LEFT TEMPORAL LOBE  ON A BACKGROUND OF MILD ELEVATED TMAX.  MOST LIKELY REPRESENTS SMALL  VESSEL ISCHEMIC DISEASE RATHER THAN AN ACUTE EMBOLIC EVENT.  COULD  CONSIDER MRI FOR FURTHER EVALUATION AS WARRANTED CLINICALLY.     This report was finalized on 5/1/2025 5:53 AM by Liliane Meadows MD.       CT Angiogram Head w AI Analysis of LVO [[id number]] Collected: 05/01/25 0549     Updated: 05/01/25 0555    Narrative:      CLINICAL HISTORY: Neurological deficit.     COMPARISON: None available.     TECHNIQUE: IV contrast was administered and helical images were acquired  through the neck and head.  Multiplanar reformats, including multiplanar  MIPS, were obtained.  Percentage of carotid stenosis is based on NASCET  criteria.  Limited exposure control, adjustment of the mA and/or KV  according to patient size or use of iterative reconstruction technique  was utilized.     FINDINGS:     CTA neck:     Aortic arch: normal aortic arch. Three-vessel branching pattern.     Right  Subclavian artery: patent.  Vertebral artery: patent  Common carotid: patent.  Internal carotid: patent.  External carotid: patent.     Left   Subclavian artery: patent.  Vertebral artery: patent  Common carotid: patent.  Internal carotid: patent.  External carotid: patent.     Other: clear lung apices; no acute soft tissue abnormality.        CTA  head:     Internal carotid arteries: patent.  Anterior cerebral arteries and main branches: patent.   Middle cerebral arteries and main branches: patent.  Vertebral and basilar arteries: patent.  Posterior cerebral arteries and main branches: patent.       Impression:         CTA NECK:  NEGATIVE FOR HEMODYNAMICALLY SIGNIFICANT STENOSIS.     CTA HEAD:  NEGATIVE FOR LARGE VESSEL OCCLUSION OR OTHER ACUTE VASCULAR ABNORMALITY.     This report was finalized on 5/1/2025 5:52 AM by Liliane Meadows MD.       CT Angiogram Neck [[id number]] Collected: 05/01/25 0549     Updated: 05/01/25 0555    Narrative:      CLINICAL HISTORY: Neurological deficit.     COMPARISON: None available.     TECHNIQUE: IV contrast was administered and helical images were acquired  through the neck and head.  Multiplanar reformats, including multiplanar  MIPS, were obtained.  Percentage of carotid stenosis is based on NASCET  criteria.  Limited exposure control, adjustment of the mA and/or KV  according to patient size or use of iterative reconstruction technique  was utilized.     FINDINGS:     CTA neck:     Aortic arch: normal aortic arch. Three-vessel branching pattern.     Right  Subclavian artery: patent.  Vertebral artery: patent  Common carotid: patent.  Internal carotid: patent.  External carotid: patent.     Left   Subclavian artery: patent.  Vertebral artery: patent  Common carotid: patent.  Internal carotid: patent.  External carotid: patent.     Other: clear lung apices; no acute soft tissue abnormality.        CTA head:     Internal carotid arteries: patent.  Anterior cerebral arteries and main branches: patent.   Middle cerebral arteries and main branches: patent.  Vertebral and basilar arteries: patent.  Posterior cerebral arteries and main branches: patent.       Impression:         CTA NECK:  NEGATIVE FOR HEMODYNAMICALLY SIGNIFICANT STENOSIS.     CTA HEAD:  NEGATIVE FOR LARGE VESSEL OCCLUSION OR OTHER ACUTE VASCULAR ABNORMALITY.      This report was finalized on 5/1/2025 5:52 AM by Liliane Meadows MD.       CT Head Without Contrast Stroke Protocol [[id number]] Collected: 05/01/25 0521     Updated: 05/01/25 0551    Narrative:      CLINICAL HISTORY: Numbness of mouth and the upper extremities.     COMPARISON: None available.     TECHNIQUE:  Noncontrast head CT was obtained. Multiplanar reformats were  generated. Limited exposure control, adjustment of the mA and/or KV  according to patient size or use of iterative reconstruction technique  was utilized.     FINDINGS: No acute intracranial hemorrhage or extra-axial collection.  Normal gray-white matter differentiation.  No CT findings for acute  territorial infarct.  Normal ventricular size.  No midline shift or mass-effect.  Intact calvarium.    Normal globes and orbits.    Paranasal sinuses and mastoid air cells are clear.  Status post  bilateral antrostomy     Discussed with Dr. Francis at 5:49 AM EDT on 5/1/2025.       Impression:         NO ACUTE INTRACRANIAL ABNORMALITY.     This report was finalized on 5/1/2025 5:49 AM by Liliane Meadows MD.                 Medications:      amLODIPine, 10 mg, Oral, Q24H  enoxaparin sodium, 40 mg, Subcutaneous, Nightly  hydroCHLOROthiazide, 12.5 mg, Oral, Daily  levothyroxine, 200 mcg, Oral, Q AM  losartan, 100 mg, Oral, Q24H  mirtazapine, 15 mg, Oral, Nightly  pantoprazole, 40 mg, Oral, Q AM  PARoxetine, 20 mg, Oral, QAM  sodium chloride, 10 mL, Intravenous, Q12H      dextrose, 100 mL/hr, Last Rate: 100 mL/hr (05/02/25 0821)        Assessment & Plan       Hyponatremia    - Severe hyponatremia, life-threatening, presumed chronic, symptomatic  - Intractable nausea  - Essential hypertension  - History of alcohol use  - Insomnia    Hyponatremia most likely multifactorial including SIADH from nausea and hydrochlorothiazide.  Other differentials include isolated free water intake with poor osmolar intake.  Admitted with 121 yesterday at 5 AM has increased to 134  that is significant overcorrection/rapid correction  Patient's nausea has resolved and CNS symptoms also completely resolved  Patient is a high risk to develop complications due to rapid correction    -Start the patient on D5W at 100 cc an hour for 3 hours  - Check stat sodium  - Change the frequency of sodium checks to every 4 hours from every 6 hours  - Okay to continue hydrochlorothiazide for now  - Would recommend avoiding mirtazapine that has a significant hyponatremia potential  - Liberalize fluid restriction to 1.5 L from 1 L  - Educated and counseled the patient and significant others present at bedside      Continue strict intake and output record  Please avoid any nephrotoxic agents, hypotension and adjust medications according to estimated GFR    REVIEWED NOTES OF CLINICAL TEAMS INVOLVED WITH PATIENT CARE.     DISCUSSED IN DETAIL WITH PATIENT AND/OR FAMILY ABOUT CURRENT CLINICAL CONDITIONS    DISCUSSED IN DETAIL WITH PATIENT AND/OR FAMILY ABOUT RISKS ASSOCIATED WITH CURRENT CLINICAL CONDITION AND RISK INVOLVED WITH CURRENT MANAGEMENT.     CODE STATUS REVIEWED.    DISCUSSED IN DETAIL WITH HOSPITALIST    Brandon Hardin MD  05/02/25  09:31 EDT

## 2025-05-02 NOTE — PROGRESS NOTES
"DOS: 2025  NAME: Toy Clemons   : 1970  PCP: Sloan Yo  Chief Complaint   Patient presents with    Numbness    Insomnia       Chief complaint: He feels he has slept remarkably well after getting the dose of the mirtazapine 15 mg at bedtime.  Subjective: No headaches.  The perioral numbness is also disappeared.  He is sitting very comfortably in the recliner.  He is able to get up and ambulate independently.    Objective:  Vital signs: /70   Pulse 72   Temp 97.2 °F (36.2 °C) (Oral)   Resp 18   Ht 170.2 cm (67\")   Wt 95.7 kg (210 lb 15.7 oz) Comment: new admit less than 24 hours  SpO2 98%   BMI 33.04 kg/m²    Gen: NAD, vitals reviewed  MS: Normal.  CN: Cranials 2-12: No focal deficits noted.  Motor: Muscles of both upper and lower extremities show good bulk power and tone.  Sensory: No sensory loss noted.  Coordination: Normal finger-to-nose coordination noted.  Gait: Patient is able to get up and ambulate independently and the Romberg is negative.    ROS:  General: Pleasant gentleman currently feeling better as he has slept well.  Neurological: No further headaches noted in the NIH stroke scale of 0.    Laboratory results:  Lab Results   Component Value Date    GLUCOSE 94 2025    CALCIUM 9.2 2025     (L) 2025    K 4.1 2025    CO2 27.2 2025    CL 97 (L) 2025    BUN 9 2025    CREATININE 0.93 2025    EGFRIFNONA 111 10/28/2020    BCR 9.7 2025    ANIONGAP 9.8 2025     Lab Results   Component Value Date    WBC 7.48 2025    HGB 15.2 2025    HCT 43.6 2025    MCV 85.5 2025     2025     Review of labs: The urine toxicology screen shows the following:     Latest Reference Range & Units 25 13:56   Amphetamine, Urine Qual Negative  Negative   Barbiturates Screen, Urine Negative  Negative   Benzodiazepine Screen, Urine Negative  Positive !   Buprenorphine, Screen, Urine Negative  Negative "   Cocaine Screen, Urine Negative  Negative   Fentanyl, Urine Negative  Negative   Methamphetamine, Ur Negative  Negative   Methadone Screen , Urine Negative  Negative   Opiate Screen Negative  Positive !   Oxycodone Screen, Urine Negative  Negative   Phencyclidine (PCP), Urine Negative  Negative   THC Screen, Urine Negative  Negative   Tricyclic Antidepressants Screen Negative  Negative   !: Data is abnormal     CMP:        Lab 05/02/25  0551 05/02/25  0101 05/01/25  1746 05/01/25  1223 05/01/25  0449   SODIUM 134* 130* 127* 125* 121*   POTASSIUM 4.1 4.0 3.7 4.2 3.4*   CHLORIDE 97* 95* 93* 91* 86*   CO2 27.2 23.7 22.2 21.6* 23.3   ANION GAP 9.8 11.3 11.8 12.4 11.7   BUN 9 9 8 6 7   CREATININE 0.93 0.89 0.94 0.75* 0.69*   EGFR 98.2 102.5 96.9 107.9 110.7   GLUCOSE 94 85 131* 100* 109*   CALCIUM 9.2 9.1 9.3 9.2 9.2   TOTAL PROTEIN  --   --   --   --  7.5   ALBUMIN  --   --   --   --  4.6   GLOBULIN  --   --   --   --  2.9   ALT (SGPT)  --   --   --   --  246*   AST (SGOT)  --   --   --   --  92*   BILIRUBIN  --   --   --   --  1.0   ALK PHOS  --   --   --   --  79    Review and interpretation of imaging: IV contrast was administered and helical images were acquired  through the neck and head.  Multiplanar reformats, including multiplanar  MIPS, were obtained.  Percentage of carotid stenosis is based on NASCET  criteria.  Limited exposure control, adjustment of the mA and/or KV  according to patient size or use of iterative reconstruction technique  was utilized.     FINDINGS:     CTA neck:     Aortic arch: normal aortic arch. Three-vessel branching pattern.     Right  Subclavian artery: patent.  Vertebral artery: patent  Common carotid: patent.  Internal carotid: patent.  External carotid: patent.     Left   Subclavian artery: patent.  Vertebral artery: patent  Common carotid: patent.  Internal carotid: patent.  External carotid: patent.     Other: clear lung apices; no acute soft tissue abnormality.        CTA  head:     Internal carotid arteries: patent.  Anterior cerebral arteries and main branches: patent.   Middle cerebral arteries and main branches: patent.  Vertebral and basilar arteries: patent.  Posterior cerebral arteries and main branches: patent.     IMPRESSION:     CTA NECK:  NEGATIVE FOR HEMODYNAMICALLY SIGNIFICANT STENOSIS.     CTA HEAD:  NEGATIVE FOR LARGE VESSEL OCCLUSION OR OTHER ACUTE VASCULAR ABNORMALITY.    CT Head Without Contrast Stroke Protocol  Result Date: 5/1/2025  CLINICAL HISTORY: Numbness of mouth and the upper extremities.  COMPARISON: None available.  TECHNIQUE:  Noncontrast head CT was obtained. Multiplanar reformats were generated. Limited exposure control, adjustment of the mA and/or KV according to patient size or use of iterative reconstruction technique was utilized.  FINDINGS: No acute intracranial hemorrhage or extra-axial collection. Normal gray-white matter differentiation.  No CT findings for acute territorial infarct. Normal ventricular size.  No midline shift or mass-effect. Intact calvarium.  Normal globes and orbits.  Paranasal sinuses and mastoid air cells are clear.  Status post bilateral antrostomy  Discussed with Dr. Francis at 5:49 AM EDT on 5/1/2025.      Impression:  NO ACUTE INTRACRANIAL ABNORMALITY.  This report was finalized on 5/1/2025 5:49 AM by Liliane Meadows MD.             MRI Brain With & Without Contrast  Result Date: 5/1/2025  EXAM:   MR Head Without and With Intravenous Contrast  EXAM DATE:   5/1/2025 9:28 AM  CLINICAL HISTORY:   Abnormal CT perfusion left temporal lobe; R93.89-Abnormal findings on diagnostic imaging of other specified body structures; R20.0-Anesthesia of skin; R20.2-Paresthesia of skin; G47.00-Insomnia, unspecified; R51.9-Headache, unspecified  TECHNIQUE:   Magnetic resonance images of the head/brain without and with intravenous contrast in multiple planes.  COMPARISON:   No relevant prior studies available.  FINDINGS:   Brain and extra-axial  spaces:  Unremarkable as visualized.  No mass. No hemorrhage.  No acute infarct.  No ventriculomegaly.   Bones/joints:  Unremarkable as visualized.  No acute fracture.   Sinuses:  Unremarkable as visualized.  No acute sinusitis.   Mastoid air cells:  Bilateral mastoid air cell fluid noted.   Orbits:  Unremarkable as visualized.      Impression:   Bilateral mastoid air cell fluid noted.  This report was finalized on 5/1/2025 9:29 AM by Dr. Moe Murray MD.         Workup to date: IV contrast was administered and dynamic images were acquired  through the brain.  Post processing using RAPID software was performed.   Limited exposure control, adjustment of the mA and/or KV according to  patient size or use of iterative reconstruction technique was utilized.     FINDINGS: In the white matter of the left temporal lobe is a focal area  of prolonged T-max greater than 6 seconds, with a total volume of 4 mL.   No corresponding abnormality on the other sequences.  Additional areas  of periventricular white matter T-max prolongation are noted  predominantly in the temporal and occipital regions.     Discussed with Dr. Francis at 5:49 AM EDT on 5/1/2025.     IMPRESSION:  Impression:     APPARENT AREA OF ISCHEMIA IN THE WHITE MATTER OF THE LEFT TEMPORAL LOBE  ON A BACKGROUND OF MILD ELEVATED TMAX.  MOST LIKELY REPRESENTS SMALL  VESSEL ISCHEMIC DISEASE RATHER THAN AN ACUTE EMBOLIC EVENT.  COULD  CONSIDER MRI FOR FURTHER EVALUATION AS WARRANTED CLINICALLY.    Results for orders placed during the hospital encounter of 05/01/25    Adult Transthoracic Echo Complete W/ Cont if Necessary Per Protocol    Interpretation Summary    Lumason image enhance was used r in order to optimize the study.    Normal left ventricular cavity size and wall thickness noted. All left ventricular wall segments contract normally.    Left ventricular ejection fraction appears to be 61 - 65%.    Left ventricular diastolic function was normal.    The aortic  valve is not well visualized. No aortic valve regurgitation or stenosis is present. The aortic valve is grossly normal in structure.    The mitral valve is structurally normal with no regurgitation or significant stenosis present.    There is no evidence of pericardial effusion. .    Although there was a label for bubble study, I did not see any bubbles in the right atrium or right ventricle to comment upon any intracardiac shunt.       Diagnoses: Patient with chronic insomnia which seems to have responded well to mirtazapine 15 mg at bedtime.      Comment: Chronic insomnia had resulted in worsening of his headaches.    Plan:  1.  Discussed with the patient and his wife about continuing the mirtazapine 15 mg at bedtime but if needed can be increased to 30 mg at bedtime and further escalated to 45 mg at bedtime if needed.  2.  To consider getting the polysomnogram in the sleep laboratory rescheduled after taking the mirtazapine so that way he will have a good night sleep and the CPAP titration can be well adjusted.  3.  Patient will be spending 1 more night to make sure that he will be safe to go home independently and sleep well without any recurrent headaches.  4.  Discussed his case in details with him and his wife as well as Dr. Rincon.    Discussed with the care team and he will be followed up tomorrow by inpatient teleneurology service.    Spent a total of 30 minutes in face-to-face evaluation and management of the patient using the dedicated telemedicine device without any interruption with the help of TIBURCIO Canales with the patient located at the [facility] and myself at a remote location.    Electronically signed by Cathy Macario MD, 05/02/25, 9:50 AM EDT.

## 2025-05-03 VITALS
HEIGHT: 67 IN | DIASTOLIC BLOOD PRESSURE: 87 MMHG | TEMPERATURE: 98.2 F | BODY MASS INDEX: 33.67 KG/M2 | OXYGEN SATURATION: 100 % | SYSTOLIC BLOOD PRESSURE: 145 MMHG | WEIGHT: 214.5 LBS | HEART RATE: 76 BPM | RESPIRATION RATE: 18 BRPM

## 2025-05-03 LAB
ANION GAP SERPL CALCULATED.3IONS-SCNC: 8.5 MMOL/L (ref 5–15)
ANION GAP SERPL CALCULATED.3IONS-SCNC: 8.9 MMOL/L (ref 5–15)
ANION GAP SERPL CALCULATED.3IONS-SCNC: 9.4 MMOL/L (ref 5–15)
BUN SERPL-MCNC: 10 MG/DL (ref 6–20)
BUN SERPL-MCNC: 11 MG/DL (ref 6–20)
BUN SERPL-MCNC: 12 MG/DL (ref 6–20)
BUN/CREAT SERPL: 12.4 (ref 7–25)
BUN/CREAT SERPL: 12.8 (ref 7–25)
BUN/CREAT SERPL: 13.5 (ref 7–25)
CALCIUM SPEC-SCNC: 9 MG/DL (ref 8.6–10.5)
CALCIUM SPEC-SCNC: 9.1 MG/DL (ref 8.6–10.5)
CALCIUM SPEC-SCNC: 9.1 MG/DL (ref 8.6–10.5)
CHLORIDE SERPL-SCNC: 94 MMOL/L (ref 98–107)
CHLORIDE SERPL-SCNC: 97 MMOL/L (ref 98–107)
CHLORIDE SERPL-SCNC: 97 MMOL/L (ref 98–107)
CO2 SERPL-SCNC: 26.5 MMOL/L (ref 22–29)
CO2 SERPL-SCNC: 26.6 MMOL/L (ref 22–29)
CO2 SERPL-SCNC: 28.1 MMOL/L (ref 22–29)
CREAT SERPL-MCNC: 0.78 MG/DL (ref 0.76–1.27)
CREAT SERPL-MCNC: 0.89 MG/DL (ref 0.76–1.27)
CREAT SERPL-MCNC: 0.89 MG/DL (ref 0.76–1.27)
EGFRCR SERPLBLD CKD-EPI 2021: 102.5 ML/MIN/1.73
EGFRCR SERPLBLD CKD-EPI 2021: 102.5 ML/MIN/1.73
EGFRCR SERPLBLD CKD-EPI 2021: 106.6 ML/MIN/1.73
GLUCOSE SERPL-MCNC: 114 MG/DL (ref 65–99)
GLUCOSE SERPL-MCNC: 128 MG/DL (ref 65–99)
GLUCOSE SERPL-MCNC: 83 MG/DL (ref 65–99)
POTASSIUM SERPL-SCNC: 3.7 MMOL/L (ref 3.5–5.2)
POTASSIUM SERPL-SCNC: 3.8 MMOL/L (ref 3.5–5.2)
POTASSIUM SERPL-SCNC: 4.1 MMOL/L (ref 3.5–5.2)
SODIUM SERPL-SCNC: 130 MMOL/L (ref 136–145)
SODIUM SERPL-SCNC: 132 MMOL/L (ref 136–145)
SODIUM SERPL-SCNC: 134 MMOL/L (ref 136–145)

## 2025-05-03 PROCEDURE — 80048 BASIC METABOLIC PNL TOTAL CA: CPT | Performed by: INTERNAL MEDICINE

## 2025-05-03 PROCEDURE — 99239 HOSP IP/OBS DSCHRG MGMT >30: CPT

## 2025-05-03 PROCEDURE — 25010000002 DIPHENHYDRAMINE PER 50 MG: Performed by: PSYCHIATRY & NEUROLOGY

## 2025-05-03 RX ORDER — HYDROXYZINE HYDROCHLORIDE 25 MG/1
25 TABLET, FILM COATED ORAL NIGHTLY PRN
Qty: 30 TABLET | Refills: 0 | Status: SHIPPED | OUTPATIENT
Start: 2025-05-03

## 2025-05-03 RX ORDER — LOSARTAN POTASSIUM 100 MG/1
50 TABLET ORAL 2 TIMES DAILY
Qty: 60 TABLET | Refills: 0 | Status: SHIPPED | OUTPATIENT
Start: 2025-05-03

## 2025-05-03 RX ORDER — ZOLPIDEM TARTRATE 5 MG/1
5 TABLET ORAL NIGHTLY PRN
Qty: 30 TABLET | Refills: 0 | Status: SHIPPED | OUTPATIENT
Start: 2025-05-03

## 2025-05-03 RX ORDER — SUMATRIPTAN 50 MG/1
TABLET, FILM COATED ORAL
Qty: 10 TABLET | Refills: 0 | Status: SHIPPED | OUTPATIENT
Start: 2025-05-03

## 2025-05-03 RX ORDER — AMLODIPINE BESYLATE 10 MG/1
10 TABLET ORAL
Qty: 30 TABLET | Refills: 0 | Status: SHIPPED | OUTPATIENT
Start: 2025-05-04

## 2025-05-03 RX ORDER — HYDRALAZINE HYDROCHLORIDE 10 MG/1
10 TABLET, FILM COATED ORAL 3 TIMES DAILY PRN
Qty: 15 TABLET | Refills: 0 | Status: SHIPPED | OUTPATIENT
Start: 2025-05-03

## 2025-05-03 RX ADMIN — PANTOPRAZOLE SODIUM 40 MG: 40 TABLET, DELAYED RELEASE ORAL at 08:08

## 2025-05-03 RX ADMIN — DIPHENHYDRAMINE HYDROCHLORIDE 25 MG: 50 INJECTION, SOLUTION INTRAMUSCULAR; INTRAVENOUS at 00:29

## 2025-05-03 RX ADMIN — LOSARTAN POTASSIUM 100 MG: 50 TABLET, FILM COATED ORAL at 08:07

## 2025-05-03 RX ADMIN — AMLODIPINE BESYLATE 10 MG: 10 TABLET ORAL at 08:08

## 2025-05-03 RX ADMIN — Medication 10 ML: at 08:08

## 2025-05-03 RX ADMIN — LEVOTHYROXINE SODIUM 200 MCG: 0.1 TABLET ORAL at 08:07

## 2025-05-03 RX ADMIN — ZOLPIDEM TARTRATE 5 MG: 5 TABLET ORAL at 00:29

## 2025-05-03 NOTE — PROGRESS NOTES
"NEPHROLOGY PROGRESS NOTE      INTERVAL HISTORY:    NEW ISSUE;  (locations/severity/duration/associated symptoms etc.)    STATUS OF CHRONIC PROBLEMS;  1.  2.  3.     REVIEW OF SYSTEM;  1.  2.      Intake/Output                               05/01/25 0701 - 05/02/25 0700 05/02/25 0701 - 05/03/25 0700 05/03/25 0701 - 05/04/25 0700     1211-3859 5221-4272 Total 7567-9732 9242-4096 Total 7116-3670 4467-5052 Total                    Intake    P.O.  240  -- 240  1020  -- 1020  480  -- 480    Total Intake 240 -- 240 1020 -- 1020 480 -- 480       Output    Urine  900  -- 900  --  -- --  --  -- --    Total Output 900 -- 900 -- -- -- -- -- --             VITAL SIGNS :     Temp:  [97.8 °F (36.6 °C)-98.3 °F (36.8 °C)] 98.2 °F (36.8 °C)  Heart Rate:  [63-98] 76  Resp:  [18] 18  BP: (126-145)/(63-87) 145/87    PHYSICAL EXAMINATION:    GENERAL EXAM  Laying in bed / sitting in chair / ambulating  Comfortable / uncomfortable / other comment    MENTAL STATUS EXAM  Alert and oriented, able to answer questions    NECK EXAM  JVP is not elevated, carotid exam normal    CARDIOVASCULAR EXAM  Regular rate and rhythm, no murmurs noted, no added heart sounds, normal apical pulsation  no edema in the lower extremities  2+ radial pulses bilateral, 2+ femoral/tibial pulses bilaterally    MUSCULOSKELETAL EXAM  No cyanosis or clubbing noted in the digits    RESPIRATORY EXAM  Lungs clear to auscultation bilaterally, respiratory effort normal    ABDOMINAL EXAM  Abdomen soft and non tender, normal bowel sounds    SKIN EXAM  No new rashes      Laboratory Data :     Albumin Albumin   Date Value Ref Range Status   05/02/2025 4.6 3.5 - 5.2 g/dL Final   05/01/2025 4.6 3.5 - 5.2 g/dL Final      Magnesium No results found for: \"MG\"       PTH               No results found for: \"PTH\"    CBC and coagulation:  Results from last 7 days   Lab Units 05/01/25  0449   WBC 10*3/mm3 7.48   HEMOGLOBIN g/dL 15.2   HEMATOCRIT % 43.6   MCV fL 85.5   MCHC g/dL 34.9 "   PLATELETS 10*3/mm3 214   INR  0.99     Acid/base balance:      Renal and electrolytes:    Results from last 7 days   Lab Units 05/03/25  0759 05/03/25  0426 05/03/25  0032 05/02/25 2031 05/02/25  1555   SODIUM mmol/L 132* 134* 130* 132* 129*   POTASSIUM mmol/L 4.1 3.7 3.8 3.9 3.9   CHLORIDE mmol/L 97* 97* 94* 95* 94*   CO2 mmol/L 26.5 28.1 26.6 25.9 24.3   BUN mg/dL 10 11 12 12 11   CREATININE mg/dL 0.78 0.89 0.89 0.95 0.86   CALCIUM mg/dL 9.1 9.0 9.1 8.8 9.0     Estimated Creatinine Clearance: 121.8 mL/min (by C-G formula based on SCr of 0.78 mg/dL).  @GFRCG:3@   Liver and pancreatic function:  Results from last 7 days   Lab Units 05/02/25  1040 05/01/25  0449   ALBUMIN g/dL 4.6 4.6   BILIRUBIN mg/dL 0.8 1.0   ALK PHOS U/L 88 79   AST (SGOT) U/L 83* 92*   ALT (SGPT) U/L 200* 246*         Cardiac:      Liver and pancreatic function:  Results from last 7 days   Lab Units 05/02/25  1040 05/01/25  0449   ALBUMIN g/dL 4.6 4.6   BILIRUBIN mg/dL 0.8 1.0   ALK PHOS U/L 88 79   AST (SGOT) U/L 83* 92*   ALT (SGPT) U/L 200* 246*         CLINICAL DATA REVIEW  CBC, Renal functions, Serum electrolytes, Acid base labs reviewed 1  Telemetry was reviewed and demonstrated sinus rhythm rate  2  12 lead EKG was reviewed and demonstrated sinus rhythm at a rate of 90 2  Independent review of CXR and demonstrated findings consistent with; *** 2  Decided to obtain old records 1  Reviewed summarized old records 2   Discussed the labs with  *** 1    MEDICINES:     amLODIPine, 10 mg, Oral, Q24H  enoxaparin sodium, 40 mg, Subcutaneous, Nightly  levothyroxine, 200 mcg, Oral, Q AM  losartan, 100 mg, Oral, Q24H  mirtazapine, 15 mg, Oral, Nightly  pantoprazole, 40 mg, Oral, Q AM  PARoxetine, 20 mg, Oral, QAM  sodium chloride, 10 mL, Intravenous, Q12H             Assessment & Plan           - Strict intake and output record.   - Please avoid any nephrotoxic agents, hypotension and adjust medications according to estimated GFR.      ***TREATMENT WITH DIURETICS BEING MONITORED CLOSELY FOR TOXICITIES.     REVIEWED NOTES OF CLINICAL TEAMS INVOLVED WITH PATIENT CARE.     DISCUSSED IN DETAIL WITH PATIENT AND/OR FAMILY ABOUT CURRENT CLINICAL CONDITION AND RESPONSE TO ONGOING MANAGEMENT.     DISCUSSED IN DETAIL WITH PATIENT AND/OR FAMILY ABOUT RISKS ASSOCIATED WITH CURRENT CLINICAL CONDITION AND RISK INVOLVED WITH CURRENT MANAGEMENT.     DISCUSSED IN DETAIL WITH HOSPITALIST    CODE STATUS REVIEWED,     Brandon Hardin MD  05/03/25  10:27 EDT

## 2025-05-03 NOTE — DISCHARGE SUMMARY
[facility] Medicine Services  DISCHARGE SUMMARY    Patient Identification:  Name:  Toy Clemons  Age:  55 y.o.  Sex:  male  :  1970  MRN:  [id number]  Visit Number:  [id number]    Date of Admission: 2025  Date of Discharge:  5/3/2025    PCP: Sloan Yo    Discharging Provider: Danae Kulkarni PA-C; Thony Rincon,*    Admission/Discharge Diagnoses     Discharge Diagnoses:  Hyponatremia, improved   Perioral numbness, resolved   Bilateral hand numbness, resolved   Headache, resolved  Insomnia, improving   BONITA  History of alcohol use disorder  Hypertension    Consults/Procedures     Consults:   Consults       Date and Time Order Name Status Description    2025 12:09 PM Inpatient Nephrology Consult Completed     2025  8:26 AM Hospitalist (on-call MD unless specified)      2025  4:48 AM Inpatient Neurology Consult Stroke Completed             Procedures Performed:         History of Presenting Illness     Toy Clemons is a 55 y.o. male with past medical history significant for COPD, hypertension, hypothyroidism, sleep apnea, past history of alcohol abuse that presented to the [facility] emergency department for evaluation of numbness. Please see the admitting history and physical for further details. Patient was found to have hyponatremia.    Hospital Course     Patient was admitted to the telemetry unit on 2025 for further observation and management.  Patient reported to the ED due to having nausea, lightheadedness, perioral numbness, and bilateral hand numbness.  He was complaining of insomnia and had not slept at all for the past 2 nights prior to arriving.  He was also complaining of a headache with associated sensitivity to light and sound.  Patient had tried multiple different agents to help with insomnia in the outpatient setting without much effect.  He had recently been on trazodone.  He had also recently been started on Paxil.   Patient noted his insomnia started not long after switching to losartan/HCTZ for blood pressure control.  Reports that time he had also stopped drinking alcohol.  Code stroke was called in the ED due to his complaints of numbness.  CT head, CT angiograms of head and neck, and cerebral perfusion study as well as MRI brain with and without contrast all negative for any acute infarct.  Nephrology was consulted on admission due to hyponatremia.  His initial sodium on admission was 121 he was initially put on a 1 L fluid restriction.  Sodium rapidly corrected, improving to 134 within 24 hours.  This being said, nephrology started him on a D5W drip to prevent overcorrection.  His fluid restriction was modified to 1.5 L daily.  After the infusion of D5W, his sodium decreased to 129.  Over the next 12 hours sodium increased from 129-134. Will discontinue hydrochlorothiazide on discharge as it can contribute to hyponatremia.  Started on mirtazapine while inpatient due to his complaints of insomnia.  This seemed to help the patient's insomnia initially, but did not seem to be beneficial the second night.  Due to its potential to cause hyponatremia, will not continue this on discharge.  Will instead continue Ambien.  Also ordered as needed hydroxyzine as patient said that a component of difficulty sleeping was due to anxiety.  He also requested follow-up in the outpatient setting with the [place] [facility] regarding the insomnia as he has talked to several people that have had their insomnia manage today.  Referral made will discharge.  Discussed follow-up with PCP and neurology to monitor the hyponatremia.  Patient verbalized understanding and agreed.  Recommended rescheduling outpatient sleep study and using Ambien prior to this to be able to complete the study.  Discussed things to promote good sleep hygiene.  Advised not having any caffeine after lunch.  Advised to avoid nicotine.  Has a hydrochlorothiazide was discontinued,  the patient was started on amlodipine.  Blood pressure seemed well-controlled while inpatient.  Recommended a blood pressure log and to present this to PCP on follow-up.   He noted that his blood pressure seems to become elevated at night, so I recommended splitting the dose of his losartan into 50 mg in the morning and 50 at evening.  Patient agreed with this plan.  Also gave the patient a small supply of as needed hydralazine if his blood pressure would become significantly elevated prior to following up with his PCP. Patient verbalized understanding and agreed with plan.    On day of discharge, it was felt that he had reached maximal inpatient benefit and was stable for discharge      >30 spent on patient encounter on day of discharge.         Discharge Vitals/Physical Examination     Vital Signs:  Temp:  [97.8 °F (36.6 °C)-98.3 °F (36.8 °C)] 98.2 °F (36.8 °C)  Heart Rate:  [63-98] 76  Resp:  [18] 18  BP: (126-145)/(63-87) 145/87  Mean Arterial Pressure (Non-Invasive) for the past 24 hrs (Last 3 readings):   Noninvasive MAP (mmHg)   05/03/25 0706 104   05/03/25 0350 96   05/02/25 2256 88     SpO2 Percentage    05/02/25 2256 05/03/25 0350 05/03/25 0706   SpO2: 97% 94% 100%     SpO2:  [94 %-100 %] 100 %  on   ;   Device (Oxygen Therapy): room air    Body mass index is 33.6 kg/m².  Wt Readings from Last 3 Encounters:   05/03/25 97.3 kg (214 lb 8 oz)   04/24/25 90.7 kg (200 lb)   04/17/25 98.9 kg (218 lb)         Physical Exam:  Physical Exam  Constitutional:       General: He is not in acute distress.     Appearance: Normal appearance.   HENT:      Head: Normocephalic and atraumatic.      Right Ear: External ear normal.      Left Ear: External ear normal.      Nose: No nasal deformity.      Mouth/Throat:      Lips: Pink.      Mouth: Mucous membranes are moist.   Eyes:      Conjunctiva/sclera: Conjunctivae normal.      Pupils: Pupils are equal, round, and reactive to light.      Comments: Exophthalmos    Cardiovascular:      Rate and Rhythm: Normal rate and regular rhythm.      Pulses:           Dorsalis pedis pulses are 2+ on the right side and 2+ on the left side.      Heart sounds: Normal heart sounds.   Pulmonary:      Effort: Pulmonary effort is normal.      Breath sounds: Normal breath sounds. No wheezing, rhonchi or rales.   Abdominal:      General: Abdomen is flat. Bowel sounds are normal.      Palpations: Abdomen is soft.      Tenderness: There is no guarding or rebound.   Genitourinary:     Comments: No wilkinson catheter in place   Musculoskeletal:      Cervical back: Neck supple. Normal range of motion.      Right lower leg: No edema.      Left lower leg: No edema.   Skin:     General: Skin is warm and dry.   Neurological:      General: No focal deficit present.      Mental Status: He is alert and oriented to person, place, and time.   Psychiatric:         Mood and Affect: Mood normal.         Behavior: Behavior normal.         Pertinent Laboratory/Radiology Results     Pertinent Laboratory Results:            Results from last 7 days   Lab Units 05/01/25  0449   WBC 10*3/mm3 7.48   HEMOGLOBIN g/dL 15.2   HEMATOCRIT % 43.6   MCV fL 85.5   MCHC g/dL 34.9   PLATELETS 10*3/mm3 214   INR  0.99     Results from last 7 days   Lab Units 05/03/25  0759 05/03/25  0426 05/03/25  0032 05/02/25  1555 05/02/25  1040 05/01/25  1223 05/01/25  0449   SODIUM mmol/L 132* 134* 130*   < > 134*  134*   < > 121*   POTASSIUM mmol/L 4.1 3.7 3.8   < > 4.0  4.0   < > 3.4*   CHLORIDE mmol/L 97* 97* 94*   < > 96*  96*   < > 86*   CO2 mmol/L 26.5 28.1 26.6   < > 24.2  25.9   < > 23.3   BUN mg/dL 10 11 12   < > 11  11   < > 7   CREATININE mg/dL 0.78 0.89 0.89   < > 0.93  0.93   < > 0.69*   CALCIUM mg/dL 9.1 9.0 9.1   < > 9.1  9.2   < > 9.2   GLUCOSE mg/dL 83 114* 128*   < > 84  87   < > 109*   ALBUMIN g/dL  --   --   --   --  4.6  --  4.6   BILIRUBIN mg/dL  --   --   --   --  0.8  --  1.0   ALK PHOS U/L  --   --   --   --  88   "--  79   AST (SGOT) U/L  --   --   --   --  83*  --  92*   ALT (SGPT) U/L  --   --   --   --  200*  --  246*    < > = values in this interval not displayed.   Estimated Creatinine Clearance: 121.8 mL/min (by C-G formula based on SCr of 0.78 mg/dL).  No results found for: \"AMMONIA\"    Glucose   Date/Time Value Ref Range Status   05/01/2025 0507 107 70 - 130 mg/dL Final     No results found for: \"HGBA1C\"  Lab Results   Component Value Date    TSH 8.960 (H) 05/02/2025    FREET4 1.04 05/02/2025       No results found for: \"BLOODCX\"  No results found for: \"URINECX\"  No results found for: \"WOUNDCX\"  No results found for: \"STOOLCX\"  No results found for: \"RESPCX\"  Microbiology Results (last 10 days)       ** No results found for the last 240 hours. **          Pain Management Panel  More data may exist         Latest Ref Rng & Units 5/1/2025 10/28/2020   Pain Management Panel   Creatinine, Urine mg/dL 93.3  -   Amphetamine, Urine Qual Negative Negative  Negative    Barbiturates Screen, Urine Negative Negative  Negative    Benzodiazepine Screen, Urine Negative Positive  Negative    Buprenorphine, Screen, Urine Negative Negative  Negative    Cocaine Screen, Urine Negative Negative  Negative    Fentanyl, Urine Negative Negative  -   Methadone Screen , Urine Negative Negative  Negative    Methamphetamine, Ur Negative Negative  -       Pertinent Radiology Results:  Imaging Results (All)       Procedure Component Value Units Date/Time    MRI Brain With & Without Contrast [[id number]] Collected: 05/01/25 0928     Updated: 05/01/25 0931    Narrative:      EXAM:    MR Head Without and With Intravenous Contrast     EXAM DATE:    5/1/2025 9:28 AM     CLINICAL HISTORY:    Abnormal CT perfusion left temporal lobe; R93.89-Abnormal findings on  diagnostic imaging of other specified body structures; R20.0-Anesthesia  of skin; R20.2-Paresthesia of skin; G47.00-Insomnia, unspecified;  R51.9-Headache, unspecified     TECHNIQUE:    Magnetic " resonance images of the head/brain without and with  intravenous contrast in multiple planes.     COMPARISON:    No relevant prior studies available.     FINDINGS:    Brain and extra-axial spaces:  Unremarkable as visualized.  No mass.   No hemorrhage.  No acute infarct.  No ventriculomegaly.    Bones/joints:  Unremarkable as visualized.  No acute fracture.    Sinuses:  Unremarkable as visualized.  No acute sinusitis.    Mastoid air cells:  Bilateral mastoid air cell fluid noted.    Orbits:  Unremarkable as visualized.       Impression:        Bilateral mastoid air cell fluid noted.     This report was finalized on 5/1/2025 9:29 AM by Dr. Moe Murray MD.       XR Chest 1 View [[id number]] Collected: 05/01/25 0731     Updated: 05/01/25 0733    Narrative:      CLINICAL HISTORY: Weakness.     COMPARISON: None available.     TECHNIQUE:  Single AP view of the chest.     FINDINGS: Lungs are clear. No pleural effusion or pneumothorax is  identified.  Mild cardiomegaly with pulmonary vascular congestion.  No  acute osseous or upper abdominal abnormality is seen.       Impression:         MILD CARDIOMEGALY AND PULMONARY VASCULAR CONGESTION.              This report was finalized on 5/1/2025 7:31 AM by Liliane Meadows MD.       CT CEREBRAL PERFUSION WITH & WITHOUT CONTRAST [[id number]] Collected: 05/01/25 0553     Updated: 05/01/25 0555    Narrative:      CLINICAL HISTORY: Bilateral hand numbness, mouth numbness.     COMPARISON: None available.     TECHNIQUE: IV contrast was administered and dynamic images were acquired  through the brain.  Post processing using RAPID software was performed.   Limited exposure control, adjustment of the mA and/or KV according to  patient size or use of iterative reconstruction technique was utilized.     FINDINGS: In the white matter of the left temporal lobe is a focal area  of prolonged T-max greater than 6 seconds, with a total volume of 4 mL.   No corresponding abnormality on the other  sequences.  Additional areas  of periventricular white matter T-max prolongation are noted  predominantly in the temporal and occipital regions.     Discussed with Dr. Francis at 5:49 AM EDT on 5/1/2025.       Impression:      Impression:     APPARENT AREA OF ISCHEMIA IN THE WHITE MATTER OF THE LEFT TEMPORAL LOBE  ON A BACKGROUND OF MILD ELEVATED TMAX.  MOST LIKELY REPRESENTS SMALL  VESSEL ISCHEMIC DISEASE RATHER THAN AN ACUTE EMBOLIC EVENT.  COULD  CONSIDER MRI FOR FURTHER EVALUATION AS WARRANTED CLINICALLY.     This report was finalized on 5/1/2025 5:53 AM by Liliane Meadows MD.       CT Angiogram Head w AI Analysis of LVO [[id number]] Collected: 05/01/25 0549     Updated: 05/01/25 0555    Narrative:      CLINICAL HISTORY: Neurological deficit.     COMPARISON: None available.     TECHNIQUE: IV contrast was administered and helical images were acquired  through the neck and head.  Multiplanar reformats, including multiplanar  MIPS, were obtained.  Percentage of carotid stenosis is based on NASCET  criteria.  Limited exposure control, adjustment of the mA and/or KV  according to patient size or use of iterative reconstruction technique  was utilized.     FINDINGS:     CTA neck:     Aortic arch: normal aortic arch. Three-vessel branching pattern.     Right  Subclavian artery: patent.  Vertebral artery: patent  Common carotid: patent.  Internal carotid: patent.  External carotid: patent.     Left   Subclavian artery: patent.  Vertebral artery: patent  Common carotid: patent.  Internal carotid: patent.  External carotid: patent.     Other: clear lung apices; no acute soft tissue abnormality.        CTA head:     Internal carotid arteries: patent.  Anterior cerebral arteries and main branches: patent.   Middle cerebral arteries and main branches: patent.  Vertebral and basilar arteries: patent.  Posterior cerebral arteries and main branches: patent.       Impression:         CTA NECK:  NEGATIVE FOR HEMODYNAMICALLY  SIGNIFICANT STENOSIS.     CTA HEAD:  NEGATIVE FOR LARGE VESSEL OCCLUSION OR OTHER ACUTE VASCULAR ABNORMALITY.     This report was finalized on 5/1/2025 5:52 AM by Liliane Meadows MD.       CT Angiogram Neck [[id number]] Collected: 05/01/25 0549     Updated: 05/01/25 0555    Narrative:      CLINICAL HISTORY: Neurological deficit.     COMPARISON: None available.     TECHNIQUE: IV contrast was administered and helical images were acquired  through the neck and head.  Multiplanar reformats, including multiplanar  MIPS, were obtained.  Percentage of carotid stenosis is based on NASCET  criteria.  Limited exposure control, adjustment of the mA and/or KV  according to patient size or use of iterative reconstruction technique  was utilized.     FINDINGS:     CTA neck:     Aortic arch: normal aortic arch. Three-vessel branching pattern.     Right  Subclavian artery: patent.  Vertebral artery: patent  Common carotid: patent.  Internal carotid: patent.  External carotid: patent.     Left   Subclavian artery: patent.  Vertebral artery: patent  Common carotid: patent.  Internal carotid: patent.  External carotid: patent.     Other: clear lung apices; no acute soft tissue abnormality.        CTA head:     Internal carotid arteries: patent.  Anterior cerebral arteries and main branches: patent.   Middle cerebral arteries and main branches: patent.  Vertebral and basilar arteries: patent.  Posterior cerebral arteries and main branches: patent.       Impression:         CTA NECK:  NEGATIVE FOR HEMODYNAMICALLY SIGNIFICANT STENOSIS.     CTA HEAD:  NEGATIVE FOR LARGE VESSEL OCCLUSION OR OTHER ACUTE VASCULAR ABNORMALITY.     This report was finalized on 5/1/2025 5:52 AM by Liliane Meadows MD.       CT Head Without Contrast Stroke Protocol [[id number]] Collected: 05/01/25 0521     Updated: 05/01/25 0551    Narrative:      CLINICAL HISTORY: Numbness of mouth and the upper extremities.     COMPARISON: None available.     TECHNIQUE:  Noncontrast  head CT was obtained. Multiplanar reformats were  generated. Limited exposure control, adjustment of the mA and/or KV  according to patient size or use of iterative reconstruction technique  was utilized.     FINDINGS: No acute intracranial hemorrhage or extra-axial collection.  Normal gray-white matter differentiation.  No CT findings for acute  territorial infarct.  Normal ventricular size.  No midline shift or mass-effect.  Intact calvarium.    Normal globes and orbits.    Paranasal sinuses and mastoid air cells are clear.  Status post  bilateral antrostomy     Discussed with Dr. Francis at 5:49 AM EDT on 5/1/2025.       Impression:         NO ACUTE INTRACRANIAL ABNORMALITY.     This report was finalized on 5/1/2025 5:49 AM by Liliane Meadows MD.               Test Results Pending at Discharge:      Discharge Disposition/Discharge Medications/Discharge Appointments     Discharge Disposition:   Home or Self Care    Condition at Discharge:  Stable   Activity Instructions    Resume activity as tolerated.            Code Status While Inpatient:  Code Status and Medical Interventions: CPR (Attempt to Resuscitate); Full Support   Ordered at: 05/01/25 1006     Code Status (Patient has no pulse and is not breathing):    CPR (Attempt to Resuscitate)     Medical Interventions (Patient has pulse or is breathing):    Full Support       Discharge Medications:     Discharge Medications        New Medications        Instructions Start Date   amLODIPine 10 MG tablet  Commonly known as: NORVASC   10 mg, Oral, Every 24 Hours Scheduled   Start Date: May 4, 2025     hydrALAZINE 10 MG tablet  Commonly known as: APRESOLINE   Take 1 tablet by mouth 3 (Three) Times a Day As Needed (SBP greater than 160).      hydrOXYzine 25 MG tablet  Commonly known as: ATARAX   25 mg, Oral, Nightly PRN      losartan 100 MG tablet  Commonly known as: COZAAR   Take one-half tablet by mouth 2 (Two) Times a Day.      SUMAtriptan 50 MG tablet  Commonly known  as: IMITREX   Take one tablet by mouth at onset of headache. May repeat dose one time in 2 hours if headache not relieved.  Maximum 200mg per 24 hours      zolpidem 5 MG tablet  Commonly known as: AMBIEN   Take 1 tablet by mouth At Night As Needed for sleep             Continue These Medications        Instructions Start Date   levothyroxine 200 MCG tablet  Commonly known as: SYNTHROID, LEVOTHROID   200 mcg, Oral, Daily      omeprazole 20 MG capsule  Commonly known as: priLOSEC   20 mg, Every Morning      PARoxetine 20 MG tablet  Commonly known as: PAXIL   20 mg, Every Morning             Stop These Medications      losartan-HCTZ (HYZAAR) 100-12.5 combo dose              Discharge Appointments:  Your Scheduled Appointments      Jun 19, 2025 8:30 AM  Follow Up with Evelyne Abbott PA-C  [facility] PULMONARY [facility]ICINE ([place]) [address]  [facility] [address] [phone]   Established: Please bring outside images or reports.                    Labs Pending at Discharge:          The ASCVD Risk score (Andrew DK, et al., 2019) failed to calculate for the following reasons:    Cannot find a previous HDL lab    Cannot find a previous total cholesterol lab     Attestation: I personally discussed the patient's hospital course, disposition, discharge planning, and discharge medications with attending physician, Dr. Rincon, prior to time of discharge.       Danae Kulkarni PA-C  Hospitalist Service -- [facility]       05/03/25  10:14 EDT    Discharge Time: 0945    Please send a copy of this dictation to the following providers:  Sloan Yo

## 2025-05-03 NOTE — NURSING NOTE
Pt being discharged home today on room air. MARIA TERESA Faith made aware the discharge is complete. Family to provide transportation.    Birth Control Pills Pregnancy And Lactation Text: This medication should be avoided if pregnant and for the first 30 days post-partum.

## 2025-05-03 NOTE — PLAN OF CARE
Goal Outcome Evaluation:   Pt resting throughout the shift. No requests/complaints at this time. VSS. No s/s of distress noted. Plan of care ongoing

## 2025-05-04 ENCOUNTER — READMISSION MANAGEMENT (OUTPATIENT)
Dept: CALL CENTER | Facility: HOSPITAL | Age: 55
End: 2025-05-04
Payer: MEDICAID

## 2025-05-04 NOTE — PAYOR COMM NOTE
"[facility]  NPI:[id number]    Utilization Review  Contact: Mary Lou Sepulveda RN  Phone: [phone]  Fax:[phone]    DISCHARGE NOTIFICATION    Toy Clemons (55 y.o. Male)       Date of Birth   1970    Social Security Number       Address   [address] [place] [address]    Home Phone   [phone]    MRN   [id number]       Restorationism   Restorationism    Marital Status                               Admission Date   5/1/2025    Admission Type   Emergency    Admitting Provider       Attending Provider       Department, Room/Bed   [facility] 3 [facility], 3314/2S       Discharge Date   5/3/2025    Discharge Disposition   Home or Self Care    Discharge Destination                                 Attending Provider: (none)   Allergies: Ace Inhibitors    Isolation: None   Infection: None   Code Status: Prior    Ht: 170.2 cm (67\")   Wt: 97.3 kg (214 lb 8 oz)    Admission Cmt: None   Principal Problem: Hyponatremia [E87.1]                   Active Insurance as of 5/1/2025       Primary Coverage       Payor Plan Insurance Group Employer/Plan Group    [place] [id number]       Payor Plan Address Payor Plan Phone Number Payor Plan Fax Number Effective Dates    [employer] [address] [address] [phone]  1/1/2020 - None Entered    [facility] [id number]         Subscriber Name Subscriber Birth Date Member ID       TOY CLEMONS 1970 [id number]                     Emergency Contacts        (Rel.) Home Phone Work Phone Mobile Phone    Raquel Cheema (Spouse) [phone] -- [phone]                 Discharge Summary        Danae Kulkarni PA-C at 05/03/25 0752       Attestation signed by Thony Rincon DO at 05/03/25 1202      I have reviewed this documentation and agree.                          [facility] Medicine Services  DISCHARGE SUMMARY    Patient Identification:  Name:  Toy Clemons  Age:  55 " y.o.  Sex:  male  :  1970  MRN:  [id number]  Visit Number:  [id number]    Date of Admission: 2025  Date of Discharge:  5/3/2025    PCP: Solan Yo    Discharging Provider: Danae Kulkarni PA-C; Thony Rincon,*    Admission/Discharge Diagnoses     Discharge Diagnoses:  Hyponatremia, improved   Perioral numbness, resolved   Bilateral hand numbness, resolved   Headache, resolved  Insomnia, improving   BONITA  History of alcohol use disorder  Hypertension    Consults/Procedures     Consults:   Consults       Date and Time Order Name Status Description    2025 12:09 PM Inpatient Nephrology Consult Completed     2025  8:26 AM Hospitalist (on-call MD unless specified)      2025  4:48 AM Inpatient Neurology Consult Stroke Completed             Procedures Performed:         History of Presenting Illness     Toy Clemons is a 55 y.o. male with past medical history significant for COPD, hypertension, hypothyroidism, sleep apnea, past history of alcohol abuse that presented to the [facility] emergency department for evaluation of numbness. Please see the admitting history and physical for further details. Patient was found to have hyponatremia.    Hospital Course     Patient was admitted to the telemetry unit on 2025 for further observation and management.  Patient reported to the ED due to having nausea, lightheadedness, perioral numbness, and bilateral hand numbness.  He was complaining of insomnia and had not slept at all for the past 2 nights prior to arriving.  He was also complaining of a headache with associated sensitivity to light and sound.  Patient had tried multiple different agents to help with insomnia in the outpatient setting without much effect.  He had recently been on trazodone.  He had also recently been started on Paxil.  Patient noted his insomnia started not long after switching to losartan/HCTZ for blood pressure control.  Reports that time he had also  stopped drinking alcohol.  Code stroke was called in the ED due to his complaints of numbness.  CT head, CT angiograms of head and neck, and cerebral perfusion study as well as MRI brain with and without contrast all negative for any acute infarct.  Nephrology was consulted on admission due to hyponatremia.  His initial sodium on admission was 121 he was initially put on a 1 L fluid restriction.  Sodium rapidly corrected, improving to 134 within 24 hours.  This being said, nephrology started him on a D5W drip to prevent overcorrection.  His fluid restriction was modified to 1.5 L daily.  After the infusion of D5W, his sodium decreased to 129.  Over the next 12 hours sodium increased from 129-134. Will discontinue hydrochlorothiazide on discharge as it can contribute to hyponatremia.  Started on mirtazapine while inpatient due to his complaints of insomnia.  This seemed to help the patient's insomnia initially, but did not seem to be beneficial the second night.  Due to its potential to cause hyponatremia, will not continue this on discharge.  Will instead continue Ambien.  Also ordered as needed hydroxyzine as patient said that a component of difficulty sleeping was due to anxiety.  He also requested follow-up in the outpatient setting with the [place] clinic regarding the insomnia as he has talked to several people that have had their insomnia manage today.  Referral made will discharge.  Discussed follow-up with PCP and neurology to monitor the hyponatremia.  Patient verbalized understanding and agreed.  Recommended rescheduling outpatient sleep study and using Ambien prior to this to be able to complete the study.  Discussed things to promote good sleep hygiene.  Advised not having any caffeine after lunch.  Advised to avoid nicotine.  Has a hydrochlorothiazide was discontinued, the patient was started on amlodipine.  Blood pressure seemed well-controlled while inpatient.  Recommended a blood pressure log and to  present this to PCP on follow-up.   He noted that his blood pressure seems to become elevated at night, so I recommended splitting the dose of his losartan into 50 mg in the morning and 50 at evening.  Patient agreed with this plan.  Also gave the patient a small supply of as needed hydralazine if his blood pressure would become significantly elevated prior to following up with his PCP. Patient verbalized understanding and agreed with plan.    On day of discharge, it was felt that he had reached maximal inpatient benefit and was stable for discharge      >30 spent on patient encounter on day of discharge.         Discharge Vitals/Physical Examination     Vital Signs:  Temp:  [97.8 °F (36.6 °C)-98.3 °F (36.8 °C)] 98.2 °F (36.8 °C)  Heart Rate:  [63-98] 76  Resp:  [18] 18  BP: (126-145)/(63-87) 145/87  Mean Arterial Pressure (Non-Invasive) for the past 24 hrs (Last 3 readings):   Noninvasive MAP (mmHg)   05/03/25 0706 104   05/03/25 0350 96   05/02/25 2256 88     SpO2 Percentage    05/02/25 2256 05/03/25 0350 05/03/25 0706   SpO2: 97% 94% 100%     SpO2:  [94 %-100 %] 100 %  on   ;   Device (Oxygen Therapy): room air    Body mass index is 33.6 kg/m².  Wt Readings from Last 3 Encounters:   05/03/25 97.3 kg (214 lb 8 oz)   04/24/25 90.7 kg (200 lb)   04/17/25 98.9 kg (218 lb)         Physical Exam:  Physical Exam  Constitutional:       General: He is not in acute distress.     Appearance: Normal appearance.   HENT:      Head: Normocephalic and atraumatic.      Right Ear: External ear normal.      Left Ear: External ear normal.      Nose: No nasal deformity.      Mouth/Throat:      Lips: Pink.      Mouth: Mucous membranes are moist.   Eyes:      Conjunctiva/sclera: Conjunctivae normal.      Pupils: Pupils are equal, round, and reactive to light.      Comments: Exophthalmos   Cardiovascular:      Rate and Rhythm: Normal rate and regular rhythm.      Pulses:           Dorsalis pedis pulses are 2+ on the right side and 2+ on  the left side.      Heart sounds: Normal heart sounds.   Pulmonary:      Effort: Pulmonary effort is normal.      Breath sounds: Normal breath sounds. No wheezing, rhonchi or rales.   Abdominal:      General: Abdomen is flat. Bowel sounds are normal.      Palpations: Abdomen is soft.      Tenderness: There is no guarding or rebound.   Genitourinary:     Comments: No wilkinson catheter in place   Musculoskeletal:      Cervical back: Neck supple. Normal range of motion.      Right lower leg: No edema.      Left lower leg: No edema.   Skin:     General: Skin is warm and dry.   Neurological:      General: No focal deficit present.      Mental Status: He is alert and oriented to person, place, and time.   Psychiatric:         Mood and Affect: Mood normal.         Behavior: Behavior normal.         Pertinent Laboratory/Radiology Results     Pertinent Laboratory Results:            Results from last 7 days   Lab Units 05/01/25  0449   WBC 10*3/mm3 7.48   HEMOGLOBIN g/dL 15.2   HEMATOCRIT % 43.6   MCV fL 85.5   MCHC g/dL 34.9   PLATELETS 10*3/mm3 214   INR  0.99     Results from last 7 days   Lab Units 05/03/25  0759 05/03/25  0426 05/03/25  0032 05/02/25  1555 05/02/25  1040 05/01/25  1223 05/01/25  0449   SODIUM mmol/L 132* 134* 130*   < > 134*  134*   < > 121*   POTASSIUM mmol/L 4.1 3.7 3.8   < > 4.0  4.0   < > 3.4*   CHLORIDE mmol/L 97* 97* 94*   < > 96*  96*   < > 86*   CO2 mmol/L 26.5 28.1 26.6   < > 24.2  25.9   < > 23.3   BUN mg/dL 10 11 12   < > 11  11   < > 7   CREATININE mg/dL 0.78 0.89 0.89   < > 0.93  0.93   < > 0.69*   CALCIUM mg/dL 9.1 9.0 9.1   < > 9.1  9.2   < > 9.2   GLUCOSE mg/dL 83 114* 128*   < > 84  87   < > 109*   ALBUMIN g/dL  --   --   --   --  4.6  --  4.6   BILIRUBIN mg/dL  --   --   --   --  0.8  --  1.0   ALK PHOS U/L  --   --   --   --  88  --  79   AST (SGOT) U/L  --   --   --   --  83*  --  92*   ALT (SGPT) U/L  --   --   --   --  200*  --  246*    < > = values in this interval not  "displayed.   Estimated Creatinine Clearance: 121.8 mL/min (by C-G formula based on SCr of 0.78 mg/dL).  No results found for: \"AMMONIA\"    Glucose   Date/Time Value Ref Range Status   05/01/2025 0507 107 70 - 130 mg/dL Final     No results found for: \"HGBA1C\"  Lab Results   Component Value Date    TSH 8.960 (H) 05/02/2025    FREET4 1.04 05/02/2025       No results found for: \"BLOODCX\"  No results found for: \"URINECX\"  No results found for: \"WOUNDCX\"  No results found for: \"STOOLCX\"  No results found for: \"RESPCX\"  Microbiology Results (last 10 days)       ** No results found for the last 240 hours. **          Pain Management Panel  More data may exist         Latest Ref Rng & Units 5/1/2025 10/28/2020   Pain Management Panel   Creatinine, Urine mg/dL 93.3  -   Amphetamine, Urine Qual Negative Negative  Negative    Barbiturates Screen, Urine Negative Negative  Negative    Benzodiazepine Screen, Urine Negative Positive  Negative    Buprenorphine, Screen, Urine Negative Negative  Negative    Cocaine Screen, Urine Negative Negative  Negative    Fentanyl, Urine Negative Negative  -   Methadone Screen , Urine Negative Negative  Negative    Methamphetamine, Ur Negative Negative  -       Pertinent Radiology Results:  Imaging Results (All)       Procedure Component Value Units Date/Time    MRI Brain With & Without Contrast [[id number]] Collected: 05/01/25 0928     Updated: 05/01/25 0931    Narrative:      EXAM:    MR Head Without and With Intravenous Contrast     EXAM DATE:    5/1/2025 9:28 AM     CLINICAL HISTORY:    Abnormal CT perfusion left temporal lobe; R93.89-Abnormal findings on  diagnostic imaging of other specified body structures; R20.0-Anesthesia  of skin; R20.2-Paresthesia of skin; G47.00-Insomnia, unspecified;  R51.9-Headache, unspecified     TECHNIQUE:    Magnetic resonance images of the head/brain without and with  intravenous contrast in multiple planes.     COMPARISON:    No relevant prior studies " available.     FINDINGS:    Brain and extra-axial spaces:  Unremarkable as visualized.  No mass.   No hemorrhage.  No acute infarct.  No ventriculomegaly.    Bones/joints:  Unremarkable as visualized.  No acute fracture.    Sinuses:  Unremarkable as visualized.  No acute sinusitis.    Mastoid air cells:  Bilateral mastoid air cell fluid noted.    Orbits:  Unremarkable as visualized.       Impression:        Bilateral mastoid air cell fluid noted.     This report was finalized on 5/1/2025 9:29 AM by Dr. Moe Murray MD.       XR Chest 1 View [[id number]] Collected: 05/01/25 0731     Updated: 05/01/25 0733    Narrative:      CLINICAL HISTORY: Weakness.     COMPARISON: None available.     TECHNIQUE:  Single AP view of the chest.     FINDINGS: Lungs are clear. No pleural effusion or pneumothorax is  identified.  Mild cardiomegaly with pulmonary vascular congestion.  No  acute osseous or upper abdominal abnormality is seen.       Impression:         MILD CARDIOMEGALY AND PULMONARY VASCULAR CONGESTION.              This report was finalized on 5/1/2025 7:31 AM by Liliane Meadows MD.       CT CEREBRAL PERFUSION WITH & WITHOUT CONTRAST [[id number]] Collected: 05/01/25 0553     Updated: 05/01/25 0555    Narrative:      CLINICAL HISTORY: Bilateral hand numbness, mouth numbness.     COMPARISON: None available.     TECHNIQUE: IV contrast was administered and dynamic images were acquired  through the brain.  Post processing using RAPID software was performed.   Limited exposure control, adjustment of the mA and/or KV according to  patient size or use of iterative reconstruction technique was utilized.     FINDINGS: In the white matter of the left temporal lobe is a focal area  of prolonged T-max greater than 6 seconds, with a total volume of 4 mL.   No corresponding abnormality on the other sequences.  Additional areas  of periventricular white matter T-max prolongation are noted  predominantly in the temporal and occipital  regions.     Discussed with Dr. Francis at 5:49 AM EDT on 5/1/2025.       Impression:      Impression:     APPARENT AREA OF ISCHEMIA IN THE WHITE MATTER OF THE LEFT TEMPORAL LOBE  ON A BACKGROUND OF MILD ELEVATED TMAX.  MOST LIKELY REPRESENTS SMALL  VESSEL ISCHEMIC DISEASE RATHER THAN AN ACUTE EMBOLIC EVENT.  COULD  CONSIDER MRI FOR FURTHER EVALUATION AS WARRANTED CLINICALLY.     This report was finalized on 5/1/2025 5:53 AM by Liliane Meadows MD.       CT Angiogram Head w AI Analysis of LVO [[id number]] Collected: 05/01/25 0549     Updated: 05/01/25 0555    Narrative:      CLINICAL HISTORY: Neurological deficit.     COMPARISON: None available.     TECHNIQUE: IV contrast was administered and helical images were acquired  through the neck and head.  Multiplanar reformats, including multiplanar  MIPS, were obtained.  Percentage of carotid stenosis is based on NASCET  criteria.  Limited exposure control, adjustment of the mA and/or KV  according to patient size or use of iterative reconstruction technique  was utilized.     FINDINGS:     CTA neck:     Aortic arch: normal aortic arch. Three-vessel branching pattern.     Right  Subclavian artery: patent.  Vertebral artery: patent  Common carotid: patent.  Internal carotid: patent.  External carotid: patent.     Left   Subclavian artery: patent.  Vertebral artery: patent  Common carotid: patent.  Internal carotid: patent.  External carotid: patent.     Other: clear lung apices; no acute soft tissue abnormality.        CTA head:     Internal carotid arteries: patent.  Anterior cerebral arteries and main branches: patent.   Middle cerebral arteries and main branches: patent.  Vertebral and basilar arteries: patent.  Posterior cerebral arteries and main branches: patent.       Impression:         CTA NECK:  NEGATIVE FOR HEMODYNAMICALLY SIGNIFICANT STENOSIS.     CTA HEAD:  NEGATIVE FOR LARGE VESSEL OCCLUSION OR OTHER ACUTE VASCULAR ABNORMALITY.     This report was finalized on  5/1/2025 5:52 AM by Liliane Meadows MD.       CT Angiogram Neck [[id number]] Collected: 05/01/25 0549     Updated: 05/01/25 0555    Narrative:      CLINICAL HISTORY: Neurological deficit.     COMPARISON: None available.     TECHNIQUE: IV contrast was administered and helical images were acquired  through the neck and head.  Multiplanar reformats, including multiplanar  MIPS, were obtained.  Percentage of carotid stenosis is based on NASCET  criteria.  Limited exposure control, adjustment of the mA and/or KV  according to patient size or use of iterative reconstruction technique  was utilized.     FINDINGS:     CTA neck:     Aortic arch: normal aortic arch. Three-vessel branching pattern.     Right  Subclavian artery: patent.  Vertebral artery: patent  Common carotid: patent.  Internal carotid: patent.  External carotid: patent.     Left   Subclavian artery: patent.  Vertebral artery: patent  Common carotid: patent.  Internal carotid: patent.  External carotid: patent.     Other: clear lung apices; no acute soft tissue abnormality.        CTA head:     Internal carotid arteries: patent.  Anterior cerebral arteries and main branches: patent.   Middle cerebral arteries and main branches: patent.  Vertebral and basilar arteries: patent.  Posterior cerebral arteries and main branches: patent.       Impression:         CTA NECK:  NEGATIVE FOR HEMODYNAMICALLY SIGNIFICANT STENOSIS.     CTA HEAD:  NEGATIVE FOR LARGE VESSEL OCCLUSION OR OTHER ACUTE VASCULAR ABNORMALITY.     This report was finalized on 5/1/2025 5:52 AM by Liliaen Meadows MD.       CT Head Without Contrast Stroke Protocol [[id number]] Collected: 05/01/25 0521     Updated: 05/01/25 0551    Narrative:      CLINICAL HISTORY: Numbness of mouth and the upper extremities.     COMPARISON: None available.     TECHNIQUE:  Noncontrast head CT was obtained. Multiplanar reformats were  generated. Limited exposure control, adjustment of the mA and/or KV  according to patient  size or use of iterative reconstruction technique  was utilized.     FINDINGS: No acute intracranial hemorrhage or extra-axial collection.  Normal gray-white matter differentiation.  No CT findings for acute  territorial infarct.  Normal ventricular size.  No midline shift or mass-effect.  Intact calvarium.    Normal globes and orbits.    Paranasal sinuses and mastoid air cells are clear.  Status post  bilateral antrostomy     Discussed with Dr. Francis at 5:49 AM EDT on 5/1/2025.       Impression:         NO ACUTE INTRACRANIAL ABNORMALITY.     This report was finalized on 5/1/2025 5:49 AM by Liliane Meadows MD.               Test Results Pending at Discharge:      Discharge Disposition/Discharge Medications/Discharge Appointments     Discharge Disposition:   Home or Self Care    Condition at Discharge:  Stable   Activity Instructions    Resume activity as tolerated.            Code Status While Inpatient:  Code Status and Medical Interventions: CPR (Attempt to Resuscitate); Full Support   Ordered at: 05/01/25 1006     Code Status (Patient has no pulse and is not breathing):    CPR (Attempt to Resuscitate)     Medical Interventions (Patient has pulse or is breathing):    Full Support       Discharge Medications:     Discharge Medications        New Medications        Instructions Start Date   amLODIPine 10 MG tablet  Commonly known as: NORVASC   10 mg, Oral, Every 24 Hours Scheduled   Start Date: May 4, 2025     hydrALAZINE 10 MG tablet  Commonly known as: APRESOLINE   Take 1 tablet by mouth 3 (Three) Times a Day As Needed (SBP greater than 160).      hydrOXYzine 25 MG tablet  Commonly known as: ATARAX   25 mg, Oral, Nightly PRN      losartan 100 MG tablet  Commonly known as: COZAAR   Take one-half tablet by mouth 2 (Two) Times a Day.      SUMAtriptan 50 MG tablet  Commonly known as: IMITREX   Take one tablet by mouth at onset of headache. May repeat dose one time in 2 hours if headache not relieved.  Maximum 200mg  per 24 hours      zolpidem 5 MG tablet  Commonly known as: AMBIEN   Take 1 tablet by mouth At Night As Needed for sleep             Continue These Medications        Instructions Start Date   levothyroxine 200 MCG tablet  Commonly known as: SYNTHROID, LEVOTHROID   200 mcg, Oral, Daily      omeprazole 20 MG capsule  Commonly known as: priLOSEC   20 mg, Every Morning      PARoxetine 20 MG tablet  Commonly known as: PAXIL   20 mg, Every Morning             Stop These Medications      losartan-HCTZ (HYZAAR) 100-12.5 combo dose              Discharge Appointments:  Your Scheduled Appointments      Jun 19, 2025 8:30 AM  Follow Up with Evelyne Abbott PA-C  [facility] PULMONARY & CRIT[facility] C[facility] MEDICINE ([place]) [address]  [place] [address] [phone]   Established: Please bring outside images or reports.                    Labs Pending at Discharge:          The ASCVD Risk score (Andrew DK, et al., 2019) failed to calculate for the following reasons:    Cannot find a previous HDL lab    Cannot find a previous total cholesterol lab     Attestation: I personally discussed the patient's hospital course, disposition, discharge planning, and discharge medications with attending physician, Dr. Rincon, prior to time of discharge.       Danae Kulkarni PA-C  Hospitalist Service -- [facility]       05/03/25  10:14 EDT    Discharge Time: 0945    Please send a copy of this dictation to the following providers:  Sloan Yo      Electronically signed by Thony Rincon DO at 05/03/25 5907

## 2025-05-04 NOTE — OUTREACH NOTE
Prep Survey      Flowsheet Row Responses   [facility] facility patient discharged from? [facility]   Is LACE score < 7 ? No   Eligibility Readm Mgmt   Discharge diagnosis Hyponatremia,   Does the patient have one of the following disease processes/diagnoses(primary or secondary)? Other   Does the patient have Home health ordered? No   Is there a DME ordered? No   Prep survey completed? Yes            LINDA DALEY - Registered Nurse

## 2025-05-08 ENCOUNTER — READMISSION MANAGEMENT (OUTPATIENT)
Dept: CALL CENTER | Facility: HOSPITAL | Age: 55
End: 2025-05-08
Payer: MEDICAID

## 2025-05-08 NOTE — OUTREACH NOTE
Medical Week 1 Survey      Flowsheet Row Responses   [facility] facility patient discharged from? [facility]   Does the patient have one of the following disease processes/diagnoses(primary or secondary)? Other   Week 1 attempt successful? No   Unsuccessful attempts Attempt 1            Shirley MTZ - Registered Nurse

## 2025-05-13 ENCOUNTER — HOSPITAL ENCOUNTER (OUTPATIENT)
Dept: CT IMAGING | Facility: HOSPITAL | Age: 55
Discharge: HOME | End: 2025-05-13
Admitting: PHYSICIAN ASSISTANT
Payer: MEDICAID

## 2025-05-13 DIAGNOSIS — F17.210 CIGARETTE NICOTINE DEPENDENCE WITHOUT COMPLICATION: ICD-10-CM

## 2025-05-13 PROCEDURE — 71271 CT THORAX LUNG CANCER SCR C-: CPT

## 2025-05-14 PROCEDURE — 71271 CT THORAX LUNG CANCER SCR C-: CPT | Performed by: RADIOLOGY

## 2025-05-20 ENCOUNTER — READMISSION MANAGEMENT (OUTPATIENT)
Dept: CALL CENTER | Facility: HOSPITAL | Age: 55
End: 2025-05-20
Payer: MEDICAID

## 2025-05-20 NOTE — OUTREACH NOTE
Medical Week 3 Survey      Flowsheet Row Responses   [facility] patient discharged from? [facility]   Does the patient have one of the following disease processes/diagnoses(primary or secondary)? Other   Week 3 attempt successful? Yes   Call start time 1449   Call end time 1453   Discharge diagnosis Hyponatremia,   Meds reviewed with patient/caregiver? Yes   Is the patient having any side effects they believe may be caused by any medication additions or changes? No   Does the patient have all medications ordered at discharge? Yes   Prescription comments Ambien med dose increased, sleeping well   Is the patient taking all medications as directed (includes completed medication regime)? Yes   Does the patient have a primary care provider?  Yes   Does the patient have an appointment with their PCP within 7 days of discharge? Yes   Has the patient kept scheduled appointments due by today? Yes   Has home health visited the patient within 72 hours of discharge? N/A   Psychosocial issues? No   Did the patient receive a copy of their discharge instructions? Yes   Nursing interventions Reviewed instructions with patient   What is the patient's perception of their health status since discharge? Improving   Is the patient/caregiver able to teach back signs and symptoms related to disease process for when to call PCP? Yes   Is the patient/caregiver able to teach back signs and symptoms related to disease process for when to call 911? Yes   Is the patient/caregiver able to teach back the hierarchy of who to call/visit for symptoms/problems? PCP, Specialist, Home health nurse, Urgent Care, ED, 911 Yes   If the patient is a current smoker, are they able to teach back resources for cessation? --  [has cut way back since hospital stay, or at work, almost in half]   Additional teach back comments states sodium close to normal, sleeping well   Week 3 Call Completed? Yes   Graduated Yes   Is the patient interested in additional calls  from an ambulatory ? No   Would this patient benefit from a Referral to [facility] Social Work? No   Call end time 0192            Odalis JARA - Registered Nurse

## 2025-05-28 ENCOUNTER — TELEPHONE (OUTPATIENT)
Age: 55
End: 2025-05-28
Payer: MEDICAID

## 2025-05-28 NOTE — TELEPHONE ENCOUNTER
Contacted patient's wife to discuss CT imaging results. Findings were discussed as noted below:     Image 33: 2 mm nodule of right lung apex.   Image 50: 3 mm nodule of right lung,   Image 78: 3 mm nodule of right lung.   Image 133: 2 mm nodule of left lower lobe.     Discussed that we can repeat CT imaging in 1 year (May 2026).

## (undated) DEVICE — SYR LUERLOK 30CC

## (undated) DEVICE — CONN Y IRR DISP 1P/U

## (undated) DEVICE — SUCTION CANISTER, 1500CC, RIGID: Brand: DEROYAL

## (undated) DEVICE — Device

## (undated) DEVICE — TUBING, SUCTION, 1/4" X 20', STRAIGHT: Brand: MEDLINE INDUSTRIES, INC.

## (undated) DEVICE — ENDOGATOR AUXILIARY WATER JET CONNECTOR: Brand: ENDOGATOR

## (undated) DEVICE — SINGLE PORT MANIFOLD: Brand: NEPTUNE 2

## (undated) DEVICE — TUBING, SUCTION, 3/16" X 10', STRAIGHT: Brand: MEDLINE

## (undated) DEVICE — GOWN,REINF,POLY,ECL,PP SLV,XL: Brand: MEDLINE

## (undated) DEVICE — Device: Brand: DEFENDO AIR/WATER/SUCTION AND BIOPSY VALVE